# Patient Record
Sex: MALE | Race: WHITE | Employment: PART TIME | URBAN - METROPOLITAN AREA
[De-identification: names, ages, dates, MRNs, and addresses within clinical notes are randomized per-mention and may not be internally consistent; named-entity substitution may affect disease eponyms.]

---

## 2022-11-21 ENCOUNTER — APPOINTMENT (OUTPATIENT)
Dept: GENERAL RADIOLOGY | Age: 72
End: 2022-11-21
Payer: MEDICARE

## 2022-11-21 ENCOUNTER — HOSPITAL ENCOUNTER (EMERGENCY)
Age: 72
Discharge: HOME | End: 2022-11-21
Attending: EMERGENCY MEDICINE
Payer: MEDICARE

## 2022-11-21 VITALS
WEIGHT: 230 LBS | RESPIRATION RATE: 18 BRPM | OXYGEN SATURATION: 98 % | SYSTOLIC BLOOD PRESSURE: 128 MMHG | TEMPERATURE: 98.3 F | HEART RATE: 80 BPM | BODY MASS INDEX: 32.93 KG/M2 | DIASTOLIC BLOOD PRESSURE: 70 MMHG | HEIGHT: 70 IN

## 2022-11-21 DIAGNOSIS — J20.9 ACUTE BRONCHITIS, UNSPECIFIED ORGANISM: ICD-10-CM

## 2022-11-21 DIAGNOSIS — J06.9 VIRAL URI WITH COUGH: Primary | ICD-10-CM

## 2022-11-21 LAB
GLUCOSE BLD-MCNC: 149 MG/DL (ref 70–99)
PERFORMED ON: ABNORMAL
RAPID INFLUENZA  B AGN: NEGATIVE
RAPID INFLUENZA A AGN: NEGATIVE
SARS-COV-2, NAAT: NOT DETECTED

## 2022-11-21 PROCEDURE — 87804 INFLUENZA ASSAY W/OPTIC: CPT

## 2022-11-21 PROCEDURE — 99284 EMERGENCY DEPT VISIT MOD MDM: CPT

## 2022-11-21 PROCEDURE — 6370000000 HC RX 637 (ALT 250 FOR IP): Performed by: EMERGENCY MEDICINE

## 2022-11-21 PROCEDURE — 71046 X-RAY EXAM CHEST 2 VIEWS: CPT

## 2022-11-21 PROCEDURE — 87635 SARS-COV-2 COVID-19 AMP PRB: CPT

## 2022-11-21 RX ORDER — GLIPIZIDE 10 MG/1
10 TABLET ORAL
COMMUNITY

## 2022-11-21 RX ORDER — BENZONATATE 100 MG/1
100 CAPSULE ORAL 2 TIMES DAILY PRN
Qty: 20 CAPSULE | Refills: 0 | Status: SHIPPED | OUTPATIENT
Start: 2022-11-21 | End: 2022-11-28

## 2022-11-21 RX ORDER — ALBUTEROL SULFATE 90 UG/1
2 AEROSOL, METERED RESPIRATORY (INHALATION) 4 TIMES DAILY PRN
Qty: 18 G | Refills: 0 | Status: SHIPPED | OUTPATIENT
Start: 2022-11-21

## 2022-11-21 RX ORDER — FAMOTIDINE, CALCIUM CARBONATE, AND MAGNESIUM HYDROXIDE 10; 800; 165 MG/1; MG/1; MG/1
1 TABLET, CHEWABLE ORAL DAILY
COMMUNITY

## 2022-11-21 RX ORDER — ALBUTEROL SULFATE 90 UG/1
2 AEROSOL, METERED RESPIRATORY (INHALATION) ONCE
Status: COMPLETED | OUTPATIENT
Start: 2022-11-21 | End: 2022-11-21

## 2022-11-21 RX ORDER — BENZONATATE 100 MG/1
200 CAPSULE ORAL ONCE
Status: COMPLETED | OUTPATIENT
Start: 2022-11-21 | End: 2022-11-21

## 2022-11-21 RX ADMIN — BENZONATATE 200 MG: 100 CAPSULE ORAL at 10:49

## 2022-11-21 RX ADMIN — ALBUTEROL SULFATE 2 PUFF: 90 AEROSOL, METERED RESPIRATORY (INHALATION) at 10:49

## 2022-11-21 ASSESSMENT — ENCOUNTER SYMPTOMS
PHOTOPHOBIA: 0
RHINORRHEA: 0
NAUSEA: 0
SHORTNESS OF BREATH: 0
COUGH: 1
VOMITING: 0
BACK PAIN: 0
ABDOMINAL PAIN: 0
DIARRHEA: 0
WHEEZING: 1

## 2022-11-21 ASSESSMENT — PAIN - FUNCTIONAL ASSESSMENT
PAIN_FUNCTIONAL_ASSESSMENT: NONE - DENIES PAIN

## 2022-11-21 NOTE — ED NOTES
Pt denies N/V/D, SOB, chest pressure or CP or fevers.  Resps even and unlab     Shaun Valles RN  11/21/22 2904

## 2022-11-21 NOTE — ED PROVIDER NOTES
Emergency Department Provider Note  Location: [address] EMERGENCY DEPARTMENT  11/21/2022     Patient Identification  Sanaz Romero is a 74 y.o. male    Chief Complaint  URI (C/o harsh NP cough, sinus and chest congestion with HA )          HPI  (History provided by patient)  Patient is a 26-year-old male history of CKD non-insulin-dependent diabetes hypertension hyperlipidemia who presents with dry nonproductive cough persistent and progressive since Wednesday. Dry hacking cough will not go away. No exacerbating alleviating factors. Denies any fevers chills shortness of breath or lightheadedness. No chest pain. No pleurisy. Also reports congestion rhinorrhea postnasal drip. Patient wanted to be evaluated make sure he is not developing a pneumonia and for additional cough suppressants. He is otherwise taking over-the-counter meds with little improvement. I have reviewed the following nursing documentation:  Allergies: Allergies   Allergen Reactions    Amoxicillin     Januvia [Sitagliptin Phosphate] Rash    Onglyza [Saxagliptin] Itching and Rash       Past medical history:  has a past medical history of CKD (chronic kidney disease), Diabetes mellitus ([address] Hyperlipidemia, and Hypertension. Past surgical history:  has a past surgical history that includes Leg Surgery; Ankle surgery; Colonoscopy (2011); and Colonoscopy (09/14/2016). Home medications:   Prior to Admission medications    Medication Sig Start Date End Date Taking?  Authorizing Provider   glipiZIDE (GLUCOTROL) 10 MG tablet Take 10 mg by mouth 2 times daily (before meals)   Yes Historical Provider, MD   Famotidine-Ca Carb-Mag Hydrox (PEPCID COMPLETE) -165 MG CHEW Take 1 tablet by mouth daily   Yes Historical Provider, MD   benzonatate (TESSALON) 100 MG capsule Take 1 capsule by mouth 2 times daily as needed for Cough 11/21/22 11/28/22 Yes Pita Trujillo MD   albuterol sulfate HFA (VENTOLIN HFA) 108 (90 Base) MCG/ACT inhaler Inhale 2 puffs into the lungs 4 times daily as needed for Wheezing 11/21/22  Yes Davidson Nichole MD   atorvastatin (LIPITOR) 20 MG tablet Take 20 mg by mouth daily. Historical Provider, MD   metformin (FORTAMET) 1000 MG (OSM) ER tablet Take 1,000 mg by mouth 2 times daily. 3/22/11   Historical Provider, MD   lisinopril (PRINIVIL;ZESTRIL) 20 MG tablet Take 20 mg by mouth daily. Historical Provider, MD   pioglitazone (ACTOS) 45 MG tablet Take 45 mg by mouth daily. Historical Provider, MD   aspirin 81 MG EC tablet Take 81 mg by mouth daily. Historical Provider, MD   therapeutic multivitamin-minerals (THERAGRAN-M) tablet Take 1 tablet by mouth daily. Historical Provider, MD   vitamin E 400 UNIT capsule Take 400 Units by mouth daily. Historical Provider, MD       Social history:  reports that he has never smoked. He has never used smokeless tobacco. He reports that he does not drink alcohol and does not use drugs. Family history:    Family History   Problem Relation Age of Onset    Heart Disease Mother     High Blood Pressure Mother     Diabetes Father     High Blood Pressure Father     Other Father         alzeimers    Diabetes Brother     Heart Disease Brother         MI    Cancer Brother         skin    High Blood Pressure Brother     High Blood Pressure Brother     Diabetes Brother          ROS  Review of Systems   Constitutional:  Negative for chills and fever. HENT:  Negative for congestion and rhinorrhea. Eyes:  Negative for photophobia and visual disturbance. Respiratory:  Positive for cough and wheezing. Negative for shortness of breath. Cardiovascular:  Negative for chest pain and palpitations. Gastrointestinal:  Negative for abdominal pain, diarrhea, nausea and vomiting. Genitourinary:  Negative for dysuria and hematuria. Musculoskeletal:  Negative for back pain and neck pain. Skin:  Negative for rash and wound. Neurological:  Negative for syncope and weakness. Radiology  XR CHEST (2 VW)    Result Date: 11/21/2022  EXAMINATION: TWO XRAY VIEWS OF THE CHEST 11/21/2022 9:41 am COMPARISON: None available. HISTORY: ORDERING SYSTEM PROVIDED HISTORY: cough TECHNOLOGIST PROVIDED HISTORY: Reason for exam:->cough Reason for Exam: cough X 5 days FINDINGS: The lungs are clear. The cardiac silhouette is within normal limits. There is no pneumothorax or pleural effusion. 1.  No acute abnormality. Bedside Ultrasound  No results found. Labs  Results for orders placed or performed during the hospital encounter of 11/21/22   COVID-19, Rapid    Specimen: Nasopharyngeal Swab   Result Value Ref Range    SARS-CoV-2, NAAT Not Detected Not Detected   Rapid influenza A/B antigens    Specimen: Nasopharyngeal   Result Value Ref Range    Rapid Influenza A Ag Negative Negative    Rapid Influenza B Ag Negative Negative         Procedures  Procedures      MDM  Patient seen and evaluated. Relevant records reviewed. - Patient is 74 y.o. male presented for persistent dry hacking cough consistent with viral URI with cough. - Exam showed as noted above  - Workup here as noted above. COVID and flu negative, chest x-ray without infiltrate. Clear breath sounds. I have low concern for pneumonia sepsis at this point do not indication for blood work. We discussed further supportive measures. Given his mild wheezing on exam also prescribing him a Ventolin inhaler. Discussed follow-up and return precautions. - I have a low concern for  other emergent process, and do not see indication for further work-up in the ER, as it is unlikely  and poses more risk than benefit. - I discussed the results, including any incidental findings, with patient. Questions answered. We agreed to discharge. Patient/family agreeable to plan and express understanding of plan. Clinical Impression:  1.  Viral URI with cough    2. Acute bronchitis, unspecified organism Disposition:  Discharge to home in good condition. Blood pressure 111/72, pulse 79, temperature 97.9 °F (36.6 °C), temperature source Oral, resp. rate 20, height 5' 10\" (1.778 m), weight 230 lb (104.3 kg), SpO2 96 %. Patient was given scripts for the following medications. I counseled patient how to take these medications. New Prescriptions    ALBUTEROL SULFATE HFA (VENTOLIN HFA) 108 (90 BASE) MCG/ACT INHALER    Inhale 2 puffs into the lungs 4 times daily as needed for Wheezing    BENZONATATE (TESSALON) 100 MG CAPSULE    Take 1 capsule by mouth 2 times daily as needed for Cough       Disposition referral (if applicable): [place] [address] [place] [facility] [address] [facility]  [phone]    Schedule an appointment as soon as possible for a visit       I, Louie March, am the primary attending of record and contributed the majority of evaluation and treatment of emergent care for this encounter. Total critical care time is 0 minutes, which excludes separately billable procedures and updating family. Time spent is specifically for management of the presenting complaint and symptoms initially, direct bedside care, reevaluation, review of records, and consultation. There was a high probability of clinically significant life-threatening deterioration in the patient's condition, which required my urgent intervention. This chart was generated in part by using Dragon Dictation system and may contain errors related to that system including errors in grammar, punctuation, and spelling, as well as words and phrases that may be inappropriate. If there are any questions or concerns please feel free to contact the dictating provider for clarification.      MD Jaems Blum MD  11/21/22 3823

## 2024-11-08 ENCOUNTER — HOSPITAL ENCOUNTER (OUTPATIENT)
Dept: NUCLEAR MEDICINE | Age: 74
Discharge: HOME | End: 2024-11-08
Attending: INTERNAL MEDICINE
Payer: MEDICARE

## 2024-11-08 VITALS — HEIGHT: 70 IN | BODY MASS INDEX: 32.93 KG/M2 | WEIGHT: 230 LBS

## 2024-11-08 DIAGNOSIS — R10.9 ACUTE ABDOMINAL PAIN: ICD-10-CM

## 2024-11-08 PROCEDURE — A9537 TC99M MEBROFENIN: HCPCS | Performed by: INTERNAL MEDICINE

## 2024-11-08 PROCEDURE — 78227 HEPATOBIL SYST IMAGE W/DRUG: CPT

## 2024-11-08 PROCEDURE — 3430000000 HC RX DIAGNOSTIC RADIOPHARMACEUTICAL: Performed by: INTERNAL MEDICINE

## 2024-11-08 PROCEDURE — 6360000002 HC RX W HCPCS: Performed by: INTERNAL MEDICINE

## 2024-11-08 PROCEDURE — 2580000003 HC RX 258: Performed by: INTERNAL MEDICINE

## 2024-11-08 RX ADMIN — SODIUM CHLORIDE 2.09 MCG: 9 INJECTION, SOLUTION INTRAVENOUS at 08:55

## 2024-11-08 RX ADMIN — Medication 5.8 MILLICURIE: at 07:56

## 2025-01-01 ENCOUNTER — APPOINTMENT (OUTPATIENT)
Dept: GENERAL RADIOLOGY | Age: 75
End: 2025-01-01
Payer: MEDICARE

## 2025-01-01 ENCOUNTER — APPOINTMENT (OUTPATIENT)
Age: 75
End: 2025-01-01
Attending: INTERNAL MEDICINE
Payer: MEDICARE

## 2025-01-01 ENCOUNTER — APPOINTMENT (OUTPATIENT)
Dept: CT IMAGING | Age: 75
End: 2025-01-01
Payer: MEDICARE

## 2025-01-01 ENCOUNTER — APPOINTMENT (OUTPATIENT)
Dept: MRI IMAGING | Age: 75
End: 2025-01-01
Payer: MEDICARE

## 2025-01-01 ENCOUNTER — APPOINTMENT (OUTPATIENT)
Dept: CT IMAGING | Age: 75
End: 2025-01-01
Attending: EMERGENCY MEDICINE
Payer: MEDICARE

## 2025-01-01 ENCOUNTER — HOSPITAL ENCOUNTER (EMERGENCY)
Age: 75
End: 2025-05-01
Attending: EMERGENCY MEDICINE
Payer: MEDICARE

## 2025-01-01 VITALS
HEIGHT: 70 IN | HEART RATE: 146 BPM | OXYGEN SATURATION: 98 % | TEMPERATURE: 98 F | WEIGHT: 207.01 LBS | SYSTOLIC BLOOD PRESSURE: 118 MMHG | DIASTOLIC BLOOD PRESSURE: 77 MMHG | RESPIRATION RATE: 18 BRPM | BODY MASS INDEX: 29.64 KG/M2

## 2025-01-01 DIAGNOSIS — R00.0 WIDE-COMPLEX TACHYCARDIA: Primary | ICD-10-CM

## 2025-01-01 DIAGNOSIS — I21.4 NSTEMI (NON-ST ELEVATED MYOCARDIAL INFARCTION) (HCC): ICD-10-CM

## 2025-01-01 DIAGNOSIS — I47.20 VENTRICULAR TACHYCARDIA (HCC): ICD-10-CM

## 2025-01-01 DIAGNOSIS — I63.9 THROMBOEMBOLIC STROKE (HCC): ICD-10-CM

## 2025-01-01 LAB
ALBUMIN SERPL-MCNC: 4.4 G/DL (ref 3.4–5)
ALBUMIN/GLOB SERPL: 1.4 {RATIO} (ref 1.1–2.2)
ALP SERPL-CCNC: 338 U/L (ref 40–129)
ALT SERPL-CCNC: 52 U/L (ref 10–40)
ANION GAP SERPL CALCULATED.3IONS-SCNC: 19 MMOL/L (ref 3–16)
AST SERPL-CCNC: 278 U/L (ref 15–37)
BASOPHILS # BLD: 0.1 K/UL (ref 0–0.2)
BASOPHILS NFR BLD: 0.8 %
BILIRUB SERPL-MCNC: 0.7 MG/DL (ref 0–1)
BUN SERPL-MCNC: 21 MG/DL (ref 7–20)
CALCIUM SERPL-MCNC: 10.5 MG/DL (ref 8.3–10.6)
CHLORIDE SERPL-SCNC: 100 MMOL/L (ref 99–110)
CO2 SERPL-SCNC: 19 MMOL/L (ref 21–32)
CREAT SERPL-MCNC: 1.3 MG/DL (ref 0.8–1.3)
DEPRECATED RDW RBC AUTO: 15.5 % (ref 12.4–15.4)
ECHO AO ARCH DIAM: 2.3 CM
ECHO AO ASC DIAM: 3.1 CM
ECHO AO ASCENDING AORTA INDEX: 1.46 CM/M2
ECHO AO DESC DIAM: 2.3 CM
ECHO AO DESCENDING AORTA INDEX: 1.08 CM/M2
ECHO AO ROOT DIAM: 3.3 CM
ECHO AO ROOT INDEX: 1.56 CM/M2
ECHO AV AREA PEAK VELOCITY: 4 CM2
ECHO AV AREA/BSA PEAK VELOCITY: 1.9 CM2/M2
ECHO AV CUSP MM: 1.8 CM
ECHO AV PEAK GRADIENT: 4 MMHG
ECHO AV PEAK VELOCITY: 1.1 M/S
ECHO AV VELOCITY RATIO: 0.73
ECHO BSA: 2.15 M2
ECHO EST RA PRESSURE: 8 MMHG
ECHO IVC PROX: 2.1 CM
ECHO LA AREA 2C: 21.2 CM2
ECHO LA AREA 4C: 22.8 CM2
ECHO LA DIAMETER INDEX: 2.17 CM/M2
ECHO LA DIAMETER: 4.6 CM
ECHO LA MAJOR AXIS: 5.5 CM
ECHO LA MINOR AXIS: 5.9 CM
ECHO LA TO AORTIC ROOT RATIO: 1.39
ECHO LA VOL BP: 68 ML (ref 18–58)
ECHO LA VOL MOD A2C: 63 ML (ref 18–58)
ECHO LA VOL MOD A4C: 71 ML (ref 18–58)
ECHO LA VOL/BSA BIPLANE: 32 ML/M2 (ref 16–34)
ECHO LA VOLUME INDEX MOD A2C: 30 ML/M2 (ref 16–34)
ECHO LA VOLUME INDEX MOD A4C: 33 ML/M2 (ref 16–34)
ECHO LV E' LATERAL VELOCITY: 6.64 CM/S
ECHO LV E' SEPTAL VELOCITY: 5.22 CM/S
ECHO LV EDV 3D: 155 ML
ECHO LV EDV A2C: 140 ML
ECHO LV EDV A4C: 122 ML
ECHO LV EDV INDEX 3D: 73 ML/M2
ECHO LV EDV INDEX A4C: 58 ML/M2
ECHO LV EDV NDEX A2C: 66 ML/M2
ECHO LV EF PHYSICIAN: 35 %
ECHO LV EJECTION FRACTION 3D: 36 %
ECHO LV EJECTION FRACTION A2C: 4 %
ECHO LV EJECTION FRACTION A4C: 34 %
ECHO LV EJECTION FRACTION BIPLANE: 15 % (ref 55–100)
ECHO LV ESV 3D: 100 ML
ECHO LV ESV A2C: 135 ML
ECHO LV ESV A4C: 81 ML
ECHO LV ESV INDEX 3D: 47 ML/M2
ECHO LV ESV INDEX A2C: 64 ML/M2
ECHO LV ESV INDEX A4C: 38 ML/M2
ECHO LV FRACTIONAL SHORTENING: 19 % (ref 28–44)
ECHO LV INTERNAL DIMENSION DIASTOLE INDEX: 2.69 CM/M2
ECHO LV INTERNAL DIMENSION DIASTOLIC: 5.7 CM (ref 4.2–5.9)
ECHO LV INTERNAL DIMENSION SYSTOLIC INDEX: 2.17 CM/M2
ECHO LV INTERNAL DIMENSION SYSTOLIC: 4.6 CM
ECHO LV ISOVOLUMETRIC RELAXATION TIME (IVRT): 77 MS
ECHO LV IVSD: 0.8 CM (ref 0.6–1)
ECHO LV MASS 2D: 157.1 G (ref 88–224)
ECHO LV MASS 3D INDEX: 89.6 G/M2
ECHO LV MASS 3D: 190 G
ECHO LV MASS INDEX 2D: 74.1 G/M2 (ref 49–115)
ECHO LV POSTERIOR WALL DIASTOLIC: 0.7 CM (ref 0.6–1)
ECHO LV RELATIVE WALL THICKNESS RATIO: 0.25
ECHO LVOT AREA: 5.3 CM2
ECHO LVOT DIAM: 2.6 CM
ECHO LVOT MEAN GRADIENT: 1 MMHG
ECHO LVOT PEAK GRADIENT: 2 MMHG
ECHO LVOT PEAK VELOCITY: 0.8 M/S
ECHO LVOT STROKE VOLUME INDEX: 33.3 ML/M2
ECHO LVOT SV: 70.6 ML
ECHO LVOT VTI: 13.3 CM
ECHO MV A VELOCITY: 0.68 M/S
ECHO MV E DECELERATION TIME (DT): 155 MS
ECHO MV E VELOCITY: 0.42 M/S
ECHO MV E/A RATIO: 0.62
ECHO MV E/E' LATERAL: 6.33
ECHO MV E/E' RATIO (AVERAGED): 7.19
ECHO MV E/E' SEPTAL: 8.05
ECHO RA AREA 4C: 11.3 CM2
ECHO RA END SYSTOLIC VOLUME APICAL 4 CHAMBER INDEX BSA: 11 ML/M2
ECHO RA VOLUME: 24 ML
ECHO RV BASAL DIMENSION: 3.4 CM
ECHO RV FREE WALL PEAK S': 10.3 CM/S
ECHO RV LONGITUDINAL DIMENSION: 6.8 CM
ECHO RV MID DIMENSION: 3.2 CM
ECHO RV TAPSE: 1.7 CM (ref 1.7–?)
EKG ATRIAL RATE: 146 BPM
EKG DIAGNOSIS: NORMAL
EKG P AXIS: 114 DEGREES
EKG P-R INTERVAL: 148 MS
EKG Q-T INTERVAL: 250 MS
EKG QRS DURATION: 130 MS
EKG QTC CALCULATION (BAZETT): 389 MS
EKG R AXIS: 84 DEGREES
EKG T AXIS: -77 DEGREES
EKG VENTRICULAR RATE: 146 BPM
EOSINOPHIL # BLD: 0 K/UL (ref 0–0.6)
EOSINOPHIL NFR BLD: 0.2 %
GFR SERPLBLD CREATININE-BSD FMLA CKD-EPI: 57 ML/MIN/{1.73_M2}
GLUCOSE BLD-MCNC: 199 MG/DL
GLUCOSE BLD-MCNC: 199 MG/DL (ref 70–99)
GLUCOSE BLD-MCNC: 211 MG/DL (ref 70–99)
GLUCOSE SERPL-MCNC: 212 MG/DL (ref 70–99)
HCT VFR BLD AUTO: 40.4 % (ref 40.5–52.5)
HGB BLD-MCNC: 13.4 G/DL (ref 13.5–17.5)
INR PPP: 1.37 (ref 0.85–1.15)
LYMPHOCYTES # BLD: 1.8 K/UL (ref 1–5.1)
LYMPHOCYTES NFR BLD: 14 %
MCH RBC QN AUTO: 27.1 PG (ref 26–34)
MCHC RBC AUTO-ENTMCNC: 33.1 G/DL (ref 31–36)
MCV RBC AUTO: 81.8 FL (ref 80–100)
MONOCYTES # BLD: 1.5 K/UL (ref 0–1.3)
MONOCYTES NFR BLD: 11.8 %
NEUTROPHILS # BLD: 9.3 K/UL (ref 1.7–7.7)
NEUTROPHILS NFR BLD: 73.2 %
PERFORMED ON: ABNORMAL
PERFORMED ON: ABNORMAL
PLATELET # BLD AUTO: 215 K/UL (ref 135–450)
PMV BLD AUTO: 8.9 FL (ref 5–10.5)
POTASSIUM SERPL-SCNC: 3.9 MMOL/L (ref 3.5–5.1)
PROT SERPL-MCNC: 7.6 G/DL (ref 6.4–8.2)
PROTHROMBIN TIME: 17 SEC (ref 11.9–14.9)
RBC # BLD AUTO: 4.94 M/UL (ref 4.2–5.9)
REASON FOR REJECTION: NORMAL
REJECTED TEST: NORMAL
SODIUM SERPL-SCNC: 138 MMOL/L (ref 136–145)
TROPONIN, HIGH SENSITIVITY: 2520 NG/L (ref 0–22)
TROPONIN, HIGH SENSITIVITY: 2906 NG/L (ref 0–22)
WBC # BLD AUTO: 12.7 K/UL (ref 4–11)

## 2025-01-01 PROCEDURE — 99285 EMERGENCY DEPT VISIT HI MDM: CPT

## 2025-01-01 PROCEDURE — 70551 MRI BRAIN STEM W/O DYE: CPT

## 2025-01-01 PROCEDURE — 6360000004 HC RX CONTRAST MEDICATION: Performed by: INTERNAL MEDICINE

## 2025-01-01 PROCEDURE — 84484 ASSAY OF TROPONIN QUANT: CPT

## 2025-01-01 PROCEDURE — 6360000002 HC RX W HCPCS: Performed by: EMERGENCY MEDICINE

## 2025-01-01 PROCEDURE — 70498 CT ANGIOGRAPHY NECK: CPT

## 2025-01-01 PROCEDURE — 85610 PROTHROMBIN TIME: CPT

## 2025-01-01 PROCEDURE — 96375 TX/PRO/DX INJ NEW DRUG ADDON: CPT

## 2025-01-01 PROCEDURE — 96374 THER/PROPH/DIAG INJ IV PUSH: CPT

## 2025-01-01 PROCEDURE — 93010 ELECTROCARDIOGRAM REPORT: CPT | Performed by: INTERNAL MEDICINE

## 2025-01-01 PROCEDURE — 71045 X-RAY EXAM CHEST 1 VIEW: CPT

## 2025-01-01 PROCEDURE — 2580000003 HC RX 258: Performed by: EMERGENCY MEDICINE

## 2025-01-01 PROCEDURE — 6360000004 HC RX CONTRAST MEDICATION: Performed by: EMERGENCY MEDICINE

## 2025-01-01 PROCEDURE — 80053 COMPREHEN METABOLIC PANEL: CPT

## 2025-01-01 PROCEDURE — 2500000003 HC RX 250 WO HCPCS: Performed by: EMERGENCY MEDICINE

## 2025-01-01 PROCEDURE — 93005 ELECTROCARDIOGRAM TRACING: CPT | Performed by: EMERGENCY MEDICINE

## 2025-01-01 PROCEDURE — 93306 TTE W/DOPPLER COMPLETE: CPT | Performed by: INTERNAL MEDICINE

## 2025-01-01 PROCEDURE — C8929 TTE W OR WO FOL WCON,DOPPLER: HCPCS

## 2025-01-01 PROCEDURE — 36415 COLL VENOUS BLD VENIPUNCTURE: CPT

## 2025-01-01 PROCEDURE — 0042T CT BRAIN PERFUSION: CPT

## 2025-01-01 PROCEDURE — 99291 CRITICAL CARE FIRST HOUR: CPT | Performed by: INTERNAL MEDICINE

## 2025-01-01 PROCEDURE — 96376 TX/PRO/DX INJ SAME DRUG ADON: CPT

## 2025-01-01 PROCEDURE — 31500 INSERT EMERGENCY AIRWAY: CPT

## 2025-01-01 PROCEDURE — 70450 CT HEAD/BRAIN W/O DYE: CPT

## 2025-01-01 PROCEDURE — 85025 COMPLETE CBC W/AUTO DIFF WBC: CPT

## 2025-01-01 RX ORDER — HEPARIN SODIUM 1000 [USP'U]/ML
INJECTION, SOLUTION INTRAVENOUS; SUBCUTANEOUS DAILY PRN
Status: COMPLETED | OUTPATIENT
Start: 2025-01-01 | End: 2025-01-01

## 2025-01-01 RX ORDER — NOREPINEPHRINE BITARTRATE 0.06 MG/ML
1-100 INJECTION, SOLUTION INTRAVENOUS CONTINUOUS
Status: DISCONTINUED | OUTPATIENT
Start: 2025-01-01 | End: 2025-01-01 | Stop reason: HOSPADM

## 2025-01-01 RX ORDER — FENTANYL CITRATE-0.9 % NACL/PF 20 MCG/2ML
50 SYRINGE (ML) INTRAVENOUS EVERY 30 MIN PRN
Refills: 0 | Status: DISCONTINUED | OUTPATIENT
Start: 2025-01-01 | End: 2025-01-01 | Stop reason: HOSPADM

## 2025-01-01 RX ORDER — PROPOFOL 10 MG/ML
5-50 INJECTION, EMULSION INTRAVENOUS CONTINUOUS
Status: DISCONTINUED | OUTPATIENT
Start: 2025-01-01 | End: 2025-01-01 | Stop reason: HOSPADM

## 2025-01-01 RX ORDER — 0.9 % SODIUM CHLORIDE 0.9 %
1000 INTRAVENOUS SOLUTION INTRAVENOUS ONCE
Status: COMPLETED | OUTPATIENT
Start: 2025-01-01 | End: 2025-01-01

## 2025-01-01 RX ORDER — IOPAMIDOL 755 MG/ML
75 INJECTION, SOLUTION INTRAVASCULAR
Status: COMPLETED | OUTPATIENT
Start: 2025-01-01 | End: 2025-01-01

## 2025-01-01 RX ORDER — HEPARIN SODIUM 5000 [USP'U]/ML
INJECTION, SOLUTION INTRAVENOUS; SUBCUTANEOUS
Status: DISCONTINUED
Start: 2025-01-01 | End: 2025-01-01 | Stop reason: HOSPADM

## 2025-01-01 RX ORDER — ASPIRIN 81 MG/1
81 TABLET, CHEWABLE ORAL ONCE
Status: DISCONTINUED | OUTPATIENT
Start: 2025-01-01 | End: 2025-01-01 | Stop reason: HOSPADM

## 2025-01-01 RX ORDER — EPINEPHRINE IN SOD CHLOR,ISO 1 MG/10 ML
SYRINGE (ML) INTRAVENOUS DAILY PRN
Status: COMPLETED | OUTPATIENT
Start: 2025-01-01 | End: 2025-01-01

## 2025-01-01 RX ORDER — FENTANYL CITRATE-0.9 % NACL/PF 10 MCG/ML
25-200 PLASTIC BAG, INJECTION (ML) INTRAVENOUS CONTINUOUS
Status: DISCONTINUED | OUTPATIENT
Start: 2025-01-01 | End: 2025-01-01 | Stop reason: HOSPADM

## 2025-01-01 RX ADMIN — EPINEPHRINE 1 MG: 0.1 INJECTION, SOLUTION ENDOTRACHEAL; INTRACARDIAC; INTRAVENOUS at 13:19

## 2025-01-01 RX ADMIN — AMIODARONE HYDROCHLORIDE 1 MG/MIN: 1.8 INJECTION, SOLUTION INTRAVENOUS at 12:34

## 2025-01-01 RX ADMIN — SODIUM CHLORIDE 1000 ML: 9 INJECTION, SOLUTION INTRAVENOUS at 11:36

## 2025-01-01 RX ADMIN — EPINEPHRINE 1 MG: 0.1 INJECTION, SOLUTION ENDOTRACHEAL; INTRACARDIAC; INTRAVENOUS at 13:30

## 2025-01-01 RX ADMIN — EPINEPHRINE 1 MG: 0.1 INJECTION, SOLUTION ENDOTRACHEAL; INTRACARDIAC; INTRAVENOUS at 13:34

## 2025-01-01 RX ADMIN — HEPARIN SODIUM 4000 UNITS: 1000 INJECTION INTRAVENOUS; SUBCUTANEOUS at 13:21

## 2025-01-01 RX ADMIN — IOPAMIDOL 75 ML: 755 INJECTION, SOLUTION INTRAVENOUS at 11:20

## 2025-01-01 RX ADMIN — SULFUR HEXAFLUORIDE 2 ML: 60.7; .19; .19 INJECTION, POWDER, LYOPHILIZED, FOR SUSPENSION INTRAVENOUS; INTRAVESICAL at 12:59

## 2025-01-01 RX ADMIN — IOPAMIDOL 40 ML: 755 INJECTION, SOLUTION INTRAVENOUS at 11:55

## 2025-01-01 RX ADMIN — EPINEPHRINE 1 MG: 0.1 INJECTION, SOLUTION ENDOTRACHEAL; INTRACARDIAC; INTRAVENOUS at 13:27

## 2025-01-01 RX ADMIN — EPINEPHRINE 1 MG: 0.1 INJECTION, SOLUTION ENDOTRACHEAL; INTRACARDIAC; INTRAVENOUS at 13:16

## 2025-01-01 RX ADMIN — EPINEPHRINE 1 MG: 0.1 INJECTION, SOLUTION ENDOTRACHEAL; INTRACARDIAC; INTRAVENOUS at 13:22

## 2025-01-07 ENCOUNTER — TRANSCRIBE ORDERS (OUTPATIENT)
Dept: ADMINISTRATIVE | Age: 75
End: 2025-01-07

## 2025-01-07 DIAGNOSIS — R14.0 BLOATING: Primary | ICD-10-CM

## 2025-01-14 ENCOUNTER — HOSPITAL ENCOUNTER (OUTPATIENT)
Dept: NUCLEAR MEDICINE | Age: 75
Discharge: HOME | End: 2025-01-14
Attending: INTERNAL MEDICINE
Payer: MEDICARE

## 2025-01-14 DIAGNOSIS — R14.0 BLOATING: ICD-10-CM

## 2025-01-14 PROCEDURE — 78264 GASTRIC EMPTYING IMG STUDY: CPT

## 2025-01-14 PROCEDURE — 3430000000 HC RX DIAGNOSTIC RADIOPHARMACEUTICAL: Performed by: INTERNAL MEDICINE

## 2025-01-14 PROCEDURE — A9541 TC99M SULFUR COLLOID: HCPCS | Performed by: INTERNAL MEDICINE

## 2025-01-14 RX ADMIN — Medication 1 MILLICURIE: at 07:31

## 2025-01-24 ENCOUNTER — HOSPITAL ENCOUNTER (OUTPATIENT)
Dept: GENERAL RADIOLOGY | Age: 75
Discharge: HOME | End: 2025-01-24
Attending: INTERNAL MEDICINE
Payer: MEDICARE

## 2025-01-24 DIAGNOSIS — K63.8219 SMALL INTESTINAL BACTERIAL OVERGROWTH: ICD-10-CM

## 2025-01-24 PROCEDURE — 74250 X-RAY XM SM INT 1CNTRST STD: CPT

## 2025-02-14 ENCOUNTER — APPOINTMENT (OUTPATIENT)
Dept: CT IMAGING | Age: 75
End: 2025-02-14
Payer: MEDICARE

## 2025-02-14 ENCOUNTER — HOSPITAL ENCOUNTER (INPATIENT)
Age: 75
LOS: 1 days | Discharge: HOME | End: 2025-02-15
Attending: STUDENT IN AN ORGANIZED HEALTH CARE EDUCATION/TRAINING PROGRAM | Admitting: INTERNAL MEDICINE
Payer: MEDICARE

## 2025-02-14 DIAGNOSIS — G45.9 TIA (TRANSIENT ISCHEMIC ATTACK): Primary | ICD-10-CM

## 2025-02-14 DIAGNOSIS — I63.9 CEREBROVASCULAR ACCIDENT (CVA), UNSPECIFIED MECHANISM (HCC): ICD-10-CM

## 2025-02-14 LAB
ALBUMIN SERPL-MCNC: 3.8 G/DL (ref 3.4–5)
ALBUMIN/GLOB SERPL: 1.2 {RATIO} (ref 1.1–2.2)
ALP SERPL-CCNC: 59 U/L (ref 40–129)
ALT SERPL-CCNC: 12 U/L (ref 10–40)
ANION GAP SERPL CALCULATED.3IONS-SCNC: 12 MMOL/L (ref 3–16)
AST SERPL-CCNC: 17 U/L (ref 15–37)
BASOPHILS # BLD: 0.1 K/UL (ref 0–0.2)
BASOPHILS NFR BLD: 1 %
BILIRUB SERPL-MCNC: 0.3 MG/DL (ref 0–1)
BUN SERPL-MCNC: 15 MG/DL (ref 7–20)
CALCIUM SERPL-MCNC: 9.3 MG/DL (ref 8.3–10.6)
CHLORIDE SERPL-SCNC: 101 MMOL/L (ref 99–110)
CO2 SERPL-SCNC: 26 MMOL/L (ref 21–32)
CREAT SERPL-MCNC: 1.1 MG/DL (ref 0.8–1.3)
DEPRECATED RDW RBC AUTO: 15.3 % (ref 12.4–15.4)
EOSINOPHIL # BLD: 0.5 K/UL (ref 0–0.6)
EOSINOPHIL NFR BLD: 6 %
FLUAV RNA RESP QL NAA+PROBE: NOT DETECTED
FLUBV RNA RESP QL NAA+PROBE: NOT DETECTED
GFR SERPLBLD CREATININE-BSD FMLA CKD-EPI: 69 ML/MIN/{1.73_M2}
GLUCOSE BLD-MCNC: 127 MG/DL (ref 70–99)
GLUCOSE BLD-MCNC: 140 MG/DL (ref 70–99)
GLUCOSE BLD-MCNC: 160 MG/DL (ref 70–99)
GLUCOSE SERPL-MCNC: 147 MG/DL (ref 70–99)
HCT VFR BLD AUTO: 40.4 % (ref 40.5–52.5)
HGB BLD-MCNC: 13.3 G/DL (ref 13.5–17.5)
LACTATE BLDV-SCNC: 1.5 MMOL/L (ref 0.4–2)
LYMPHOCYTES # BLD: 2.3 K/UL (ref 1–5.1)
LYMPHOCYTES NFR BLD: 30 %
MCH RBC QN AUTO: 26.9 PG (ref 26–34)
MCHC RBC AUTO-ENTMCNC: 32.9 G/DL (ref 31–36)
MCV RBC AUTO: 81.9 FL (ref 80–100)
MONOCYTES # BLD: 0.9 K/UL (ref 0–1.3)
MONOCYTES NFR BLD: 11.9 %
NEUTROPHILS # BLD: 3.9 K/UL (ref 1.7–7.7)
NEUTROPHILS NFR BLD: 51.1 %
PERFORMED ON: ABNORMAL
PLATELET # BLD AUTO: 228 K/UL (ref 135–450)
PMV BLD AUTO: 9.1 FL (ref 5–10.5)
POTASSIUM SERPL-SCNC: 3.7 MMOL/L (ref 3.5–5.1)
PROT SERPL-MCNC: 6.9 G/DL (ref 6.4–8.2)
RBC # BLD AUTO: 4.94 M/UL (ref 4.2–5.9)
SARS-COV-2 RNA RESP QL NAA+PROBE: NOT DETECTED
SODIUM SERPL-SCNC: 139 MMOL/L (ref 136–145)
TROPONIN, HIGH SENSITIVITY: 25 NG/L (ref 0–22)
TROPONIN, HIGH SENSITIVITY: 26 NG/L (ref 0–22)
WBC # BLD AUTO: 7.7 K/UL (ref 4–11)

## 2025-02-14 PROCEDURE — 80053 COMPREHEN METABOLIC PANEL: CPT

## 2025-02-14 PROCEDURE — 6370000000 HC RX 637 (ALT 250 FOR IP): Performed by: INTERNAL MEDICINE

## 2025-02-14 PROCEDURE — 85025 COMPLETE CBC W/AUTO DIFF WBC: CPT

## 2025-02-14 PROCEDURE — 83605 ASSAY OF LACTIC ACID: CPT

## 2025-02-14 PROCEDURE — 1200000000 HC SEMI PRIVATE

## 2025-02-14 PROCEDURE — 93005 ELECTROCARDIOGRAM TRACING: CPT | Performed by: STUDENT IN AN ORGANIZED HEALTH CARE EDUCATION/TRAINING PROGRAM

## 2025-02-14 PROCEDURE — 84484 ASSAY OF TROPONIN QUANT: CPT

## 2025-02-14 PROCEDURE — 70496 CT ANGIOGRAPHY HEAD: CPT

## 2025-02-14 PROCEDURE — 99285 EMERGENCY DEPT VISIT HI MDM: CPT

## 2025-02-14 PROCEDURE — 6360000004 HC RX CONTRAST MEDICATION: Performed by: STUDENT IN AN ORGANIZED HEALTH CARE EDUCATION/TRAINING PROGRAM

## 2025-02-14 PROCEDURE — 70450 CT HEAD/BRAIN W/O DYE: CPT

## 2025-02-14 PROCEDURE — 93005 ELECTROCARDIOGRAM TRACING: CPT | Performed by: INTERNAL MEDICINE

## 2025-02-14 PROCEDURE — 87636 SARSCOV2 & INF A&B AMP PRB: CPT

## 2025-02-14 RX ORDER — GLUCAGON 1 MG/ML
1 KIT INJECTION PRN
Status: DISCONTINUED | OUTPATIENT
Start: 2025-02-14 | End: 2025-02-15 | Stop reason: HOSPADM

## 2025-02-14 RX ORDER — ASPIRIN 81 MG/1
81 TABLET, CHEWABLE ORAL ONCE
Status: COMPLETED | OUTPATIENT
Start: 2025-02-14 | End: 2025-02-14

## 2025-02-14 RX ORDER — ASPIRIN 81 MG/1
81 TABLET ORAL DAILY
Status: DISCONTINUED | OUTPATIENT
Start: 2025-02-15 | End: 2025-02-14 | Stop reason: SDUPTHER

## 2025-02-14 RX ORDER — NICOTINE 21 MG/24HR
1 PATCH, TRANSDERMAL 24 HOURS TRANSDERMAL DAILY PRN
Status: DISCONTINUED | OUTPATIENT
Start: 2025-02-14 | End: 2025-02-15 | Stop reason: HOSPADM

## 2025-02-14 RX ORDER — LISINOPRIL 10 MG/1
10 TABLET ORAL DAILY
Status: DISCONTINUED | OUTPATIENT
Start: 2025-02-15 | End: 2025-02-15 | Stop reason: HOSPADM

## 2025-02-14 RX ORDER — POLYETHYLENE GLYCOL 3350 17 G/17G
17 POWDER, FOR SOLUTION ORAL DAILY PRN
Status: DISCONTINUED | OUTPATIENT
Start: 2025-02-14 | End: 2025-02-15 | Stop reason: HOSPADM

## 2025-02-14 RX ORDER — DEXTROSE MONOHYDRATE 100 MG/ML
INJECTION, SOLUTION INTRAVENOUS CONTINUOUS PRN
Status: DISCONTINUED | OUTPATIENT
Start: 2025-02-14 | End: 2025-02-15 | Stop reason: HOSPADM

## 2025-02-14 RX ORDER — INSULIN LISPRO 100 [IU]/ML
0-4 INJECTION, SOLUTION INTRAVENOUS; SUBCUTANEOUS
Status: DISCONTINUED | OUTPATIENT
Start: 2025-02-14 | End: 2025-02-15 | Stop reason: HOSPADM

## 2025-02-14 RX ORDER — ASPIRIN 300 MG/1
300 SUPPOSITORY RECTAL DAILY
Status: DISCONTINUED | OUTPATIENT
Start: 2025-02-15 | End: 2025-02-15 | Stop reason: HOSPADM

## 2025-02-14 RX ORDER — ATORVASTATIN CALCIUM 10 MG/1
20 TABLET, FILM COATED ORAL DAILY
Status: DISCONTINUED | OUTPATIENT
Start: 2025-02-15 | End: 2025-02-14 | Stop reason: SDUPTHER

## 2025-02-14 RX ORDER — IOPAMIDOL 755 MG/ML
75 INJECTION, SOLUTION INTRAVASCULAR
Status: COMPLETED | OUTPATIENT
Start: 2025-02-14 | End: 2025-02-14

## 2025-02-14 RX ORDER — ONDANSETRON 2 MG/ML
4 INJECTION INTRAMUSCULAR; INTRAVENOUS EVERY 6 HOURS PRN
Status: DISCONTINUED | OUTPATIENT
Start: 2025-02-14 | End: 2025-02-15 | Stop reason: HOSPADM

## 2025-02-14 RX ORDER — ATORVASTATIN CALCIUM 40 MG/1
80 TABLET, FILM COATED ORAL NIGHTLY
Status: DISCONTINUED | OUTPATIENT
Start: 2025-02-14 | End: 2025-02-15 | Stop reason: HOSPADM

## 2025-02-14 RX ORDER — ACETAMINOPHEN 650 MG/1
650 SUPPOSITORY RECTAL EVERY 6 HOURS PRN
Status: DISCONTINUED | OUTPATIENT
Start: 2025-02-14 | End: 2025-02-15 | Stop reason: HOSPADM

## 2025-02-14 RX ORDER — PROMETHAZINE HYDROCHLORIDE 25 MG/1
12.5 TABLET ORAL EVERY 6 HOURS PRN
Status: DISCONTINUED | OUTPATIENT
Start: 2025-02-14 | End: 2025-02-15 | Stop reason: HOSPADM

## 2025-02-14 RX ORDER — LABETALOL HYDROCHLORIDE 5 MG/ML
10 INJECTION, SOLUTION INTRAVENOUS EVERY 10 MIN PRN
Status: DISCONTINUED | OUTPATIENT
Start: 2025-02-14 | End: 2025-02-15 | Stop reason: HOSPADM

## 2025-02-14 RX ORDER — ACETAMINOPHEN 325 MG/1
650 TABLET ORAL EVERY 6 HOURS PRN
Status: DISCONTINUED | OUTPATIENT
Start: 2025-02-14 | End: 2025-02-15 | Stop reason: HOSPADM

## 2025-02-14 RX ORDER — FAMOTIDINE 20 MG/1
10 TABLET, FILM COATED ORAL DAILY
Status: DISCONTINUED | OUTPATIENT
Start: 2025-02-15 | End: 2025-02-15 | Stop reason: HOSPADM

## 2025-02-14 RX ORDER — ASPIRIN 81 MG/1
81 TABLET, CHEWABLE ORAL DAILY
Status: DISCONTINUED | OUTPATIENT
Start: 2025-02-15 | End: 2025-02-15 | Stop reason: HOSPADM

## 2025-02-14 RX ADMIN — ACETAMINOPHEN 650 MG: 325 TABLET ORAL at 23:52

## 2025-02-14 RX ADMIN — ATORVASTATIN CALCIUM 80 MG: 40 TABLET, FILM COATED ORAL at 21:38

## 2025-02-14 RX ADMIN — ASPIRIN 81 MG: 81 TABLET, CHEWABLE ORAL at 21:35

## 2025-02-14 RX ADMIN — IOPAMIDOL 75 ML: 755 INJECTION, SOLUTION INTRAVENOUS at 19:26

## 2025-02-14 ASSESSMENT — PAIN DESCRIPTION - FREQUENCY: FREQUENCY: CONTINUOUS

## 2025-02-14 ASSESSMENT — PAIN SCALES - GENERAL: PAINLEVEL_OUTOF10: 5

## 2025-02-14 ASSESSMENT — PAIN - FUNCTIONAL ASSESSMENT
PAIN_FUNCTIONAL_ASSESSMENT: NONE - DENIES PAIN
PAIN_FUNCTIONAL_ASSESSMENT: ACTIVITIES ARE NOT PREVENTED

## 2025-02-14 ASSESSMENT — PAIN DESCRIPTION - DESCRIPTORS: DESCRIPTORS: ACHING

## 2025-02-14 ASSESSMENT — PAIN DESCRIPTION - ORIENTATION: ORIENTATION: MID

## 2025-02-14 ASSESSMENT — PAIN DESCRIPTION - PAIN TYPE: TYPE: CHRONIC PAIN

## 2025-02-14 ASSESSMENT — PAIN DESCRIPTION - ONSET: ONSET: ON-GOING

## 2025-02-14 ASSESSMENT — LIFESTYLE VARIABLES
HOW MANY STANDARD DRINKS CONTAINING ALCOHOL DO YOU HAVE ON A TYPICAL DAY: PATIENT DOES NOT DRINK
HOW OFTEN DO YOU HAVE A DRINK CONTAINING ALCOHOL: NEVER

## 2025-02-14 ASSESSMENT — PAIN DESCRIPTION - LOCATION: LOCATION: BACK

## 2025-02-14 NOTE — ED PROVIDER NOTES
[facility] TELEMETRY     EMERGENCY DEPARTMENT ENCOUNTER            Pt Name: Trevor Montoya   MRN: [id number]   Birthdate 1950   Date of evaluation: 2/14/2025   Provider: Jimena Mcintyre MD   PCP: No primary care provider on file.   Note Started: 6:02 PM EST 2/14/25          CHIEF COMPLAINT     Chief Complaint   Patient presents with    Aphasia     From home via EMS. Pt was having aphasia starting at 330pm. No symptoms since EMS arrival to home.         HISTORY OF PRESENT ILLNESS:   History from : Patient   Limitations to history : None     Trevor Montoya is a 74 y.o. male who presents complaining of episode of aphasia and word salad.  Patient states that he was running errands this morning, saw his chiropractor and had an adjustment, has been having some neck stiffness since then.  States that he was on the phone with his brother and had an about 20-minute episode where he was having difficulty finding words and when he was able to get words out they were not making sense.  His symptoms have since resolved.  He denies any numbness or tingling.  Denies falls or injuries or focal weakness.  He states that he follows with Dr. Olmedo in GI and is being treated for small intestine bacterial overgrowth and has had some abdominal bloating and epigastric pain today which is not abnormal for him.  He denies any other complaints or concerns.  Last known normal was around 3:30 PM.    Nursing Notes were all reviewed and agreed with, or any disagreements were addressed in the HPI.     REVIEW OF SYSTEMS :    Positives and Pertinent negatives as per HPI.      MEDICAL HISTORY   has a past medical history of CKD (chronic kidney disease), Diabetes mellitus (HCC), Hyperlipidemia, and Hypertension.    Past Surgical History:   Procedure Laterality Date    ANKLE SURGERY      orif    COLONOSCOPY  2011    COLONOSCOPY  09/14/2016    divert,polyps    LEG SURGERY      orif      CURRENTMEDICATIONS       Current Discharge  NOT to be included for SEP-1 Core Measure due to:  Infection is not suspected       CC/HPI Summary, DDx, ED Course, and Reassessment: Patient is a 74-year-old male, presenting with concerns for episode of aphasia and word salad.  Patient states that he was running errands this morning, did go and have an adjustment on his neck by his chiropractor, and was on the phone with his brother this afternoon when he was having difficulty with word finding and was not making sense when he was talking to his brother.  States that this episode lasted about 20 minutes and his family members apparently called EMS and he was brought to the hospital.  Upon arrival, he is asymptomatic.  He denies pain.  Denies any other complaints or concerns.  His workup here is overall reassuring.  CT of the head is negative.  His NIH was 0 upon arrival as a stroke alert was not initiated.  However, with concerns for TIA, patient will be hospitalized for further workup and treatment of his condition.  He is comfortable in agreement with plan of care.       Patient was given the following medications:   Medications   promethazine (PHENERGAN) tablet 12.5 mg (has no administration in time range)     Or   ondansetron (ZOFRAN) injection 4 mg (has no administration in time range)   melatonin tablet 3 mg (has no administration in time range)   nicotine (NICODERM CQ) 21 MG/24HR 1 patch (has no administration in time range)   acetaminophen (TYLENOL) tablet 650 mg (650 mg Oral Given 2/14/25 8192)     Or   acetaminophen (TYLENOL) suppository 650 mg ( Rectal See Alternative 2/14/25 2352)   polyethylene glycol (GLYCOLAX) packet 17 g (has no administration in time range)   aspirin chewable tablet 81 mg (has no administration in time range)     Or   aspirin suppository 300 mg (has no administration in time range)   atorvastatin (LIPITOR) tablet 80 mg (80 mg Oral Given 2/14/25 1940)   labetalol (NORMODYNE;TRANDATE) injection 10 mg (has no administration in time

## 2025-02-15 ENCOUNTER — APPOINTMENT (OUTPATIENT)
Age: 75
End: 2025-02-15
Attending: INTERNAL MEDICINE
Payer: MEDICARE

## 2025-02-15 ENCOUNTER — APPOINTMENT (OUTPATIENT)
Dept: MRI IMAGING | Age: 75
End: 2025-02-15
Payer: MEDICARE

## 2025-02-15 VITALS
RESPIRATION RATE: 16 BRPM | WEIGHT: 207 LBS | OXYGEN SATURATION: 97 % | HEART RATE: 86 BPM | SYSTOLIC BLOOD PRESSURE: 166 MMHG | TEMPERATURE: 97.7 F | HEIGHT: 70 IN | BODY MASS INDEX: 29.63 KG/M2 | DIASTOLIC BLOOD PRESSURE: 72 MMHG

## 2025-02-15 PROBLEM — E78.2 MIXED HYPERLIPIDEMIA: Status: ACTIVE | Noted: 2025-02-15

## 2025-02-15 PROBLEM — E11.9 WELL CONTROLLED TYPE 2 DIABETES MELLITUS (HCC): Status: ACTIVE | Noted: 2025-02-15

## 2025-02-15 PROBLEM — I10 BENIGN ESSENTIAL HTN: Status: ACTIVE | Noted: 2025-02-15

## 2025-02-15 LAB
ALBUMIN SERPL-MCNC: 3.6 G/DL (ref 3.4–5)
ALBUMIN/GLOB SERPL: 1.5 {RATIO} (ref 1.1–2.2)
ALP SERPL-CCNC: 57 U/L (ref 40–129)
ALT SERPL-CCNC: 8 U/L (ref 10–40)
ANION GAP SERPL CALCULATED.3IONS-SCNC: 13 MMOL/L (ref 3–16)
AST SERPL-CCNC: 20 U/L (ref 15–37)
BASOPHILS # BLD: 0.1 K/UL (ref 0–0.2)
BASOPHILS NFR BLD: 1.1 %
BILIRUB SERPL-MCNC: 0.4 MG/DL (ref 0–1)
BUN SERPL-MCNC: 13 MG/DL (ref 7–20)
CALCIUM SERPL-MCNC: 9.2 MG/DL (ref 8.3–10.6)
CHLORIDE SERPL-SCNC: 104 MMOL/L (ref 99–110)
CHOLEST SERPL-MCNC: 112 MG/DL (ref 0–199)
CO2 SERPL-SCNC: 22 MMOL/L (ref 21–32)
CREAT SERPL-MCNC: 1.2 MG/DL (ref 0.8–1.3)
DEPRECATED RDW RBC AUTO: 15.4 % (ref 12.4–15.4)
ECHO AO ASC DIAM: 3.7 CM
ECHO AO ASCENDING AORTA INDEX: 1.75 CM/M2
ECHO AO ROOT DIAM: 3.4 CM
ECHO AO ROOT INDEX: 1.6 CM/M2
ECHO AV MEAN GRADIENT: 6 MMHG
ECHO AV MEAN VELOCITY: 1.1 M/S
ECHO AV PEAK GRADIENT: 8 MMHG
ECHO AV PEAK VELOCITY: 1.5 M/S
ECHO AV VELOCITY RATIO: 0.8
ECHO AV VTI: 33.1 CM
ECHO BSA: 2.15 M2
ECHO EST RA PRESSURE: 3 MMHG
ECHO IVC EXP: 1.8 CM
ECHO IVC INSP: 0.3 CM
ECHO LA AREA 2C: 19.2 CM2
ECHO LA AREA 4C: 18.2 CM2
ECHO LA MAJOR AXIS: 5.1 CM
ECHO LA MINOR AXIS: 5.5 CM
ECHO LA VOL BP: 55 ML (ref 18–58)
ECHO LA VOL MOD A2C: 55 ML (ref 18–58)
ECHO LA VOL MOD A4C: 52 ML (ref 18–58)
ECHO LA VOL/BSA BIPLANE: 26 ML/M2 (ref 16–34)
ECHO LA VOLUME INDEX MOD A2C: 26 ML/M2 (ref 16–34)
ECHO LA VOLUME INDEX MOD A4C: 25 ML/M2 (ref 16–34)
ECHO LV E' LATERAL VELOCITY: 10.8 CM/S
ECHO LV E' SEPTAL VELOCITY: 7.98 CM/S
ECHO LV EF PHYSICIAN: 55 %
ECHO LV FRACTIONAL SHORTENING: 52 % (ref 28–44)
ECHO LV INTERNAL DIMENSION DIASTOLE INDEX: 2.45 CM/M2
ECHO LV INTERNAL DIMENSION DIASTOLIC: 5.2 CM (ref 4.2–5.9)
ECHO LV INTERNAL DIMENSION SYSTOLIC INDEX: 1.18 CM/M2
ECHO LV INTERNAL DIMENSION SYSTOLIC: 2.5 CM
ECHO LV ISOVOLUMETRIC RELAXATION TIME (IVRT): 111 MS
ECHO LV IVSD: 1 CM (ref 0.6–1)
ECHO LV MASS 2D: 181.4 G (ref 88–224)
ECHO LV MASS INDEX 2D: 85.6 G/M2 (ref 49–115)
ECHO LV POSTERIOR WALL DIASTOLIC: 0.9 CM (ref 0.6–1)
ECHO LV RELATIVE WALL THICKNESS RATIO: 0.35
ECHO LVOT AV VTI INDEX: 0.75
ECHO LVOT MEAN GRADIENT: 3 MMHG
ECHO LVOT PEAK GRADIENT: 6 MMHG
ECHO LVOT PEAK VELOCITY: 1.2 M/S
ECHO LVOT VTI: 24.7 CM
ECHO MV A VELOCITY: 1.21 M/S
ECHO MV E DECELERATION TIME (DT): 261 MS
ECHO MV E VELOCITY: 0.61 M/S
ECHO MV E/A RATIO: 0.5
ECHO MV E/E' LATERAL: 5.65
ECHO MV E/E' RATIO (AVERAGED): 6.65
ECHO MV E/E' SEPTAL: 7.64
ECHO MV LVOT VTI INDEX: 0.81
ECHO MV MAX VELOCITY: 1.1 M/S
ECHO MV MEAN GRADIENT: 2 MMHG
ECHO MV MEAN VELOCITY: 0.7 M/S
ECHO MV PEAK GRADIENT: 4 MMHG
ECHO MV VTI: 20 CM
ECHO PV MAX VELOCITY: 1.1 M/S
ECHO PV MEAN GRADIENT: 3 MMHG
ECHO PV MEAN VELOCITY: 0.8 M/S
ECHO PV PEAK GRADIENT: 5 MMHG
ECHO PV VTI: 22.9 CM
ECHO RA AREA 4C: 13.1 CM2
ECHO RA END SYSTOLIC VOLUME APICAL 4 CHAMBER INDEX BSA: 14 ML/M2
ECHO RA VOLUME: 30 ML
ECHO RV BASAL DIMENSION: 3.7 CM
ECHO RV FREE WALL PEAK S': 11.7 CM/S
ECHO RV LONGITUDINAL DIMENSION: 7.1 CM
ECHO RV MID DIMENSION: 2.7 CM
ECHO RV TAPSE: 2.3 CM (ref 1.7–?)
EKG ATRIAL RATE: 81 BPM
EKG ATRIAL RATE: 84 BPM
EKG DIAGNOSIS: NORMAL
EKG DIAGNOSIS: NORMAL
EKG P AXIS: 28 DEGREES
EKG P AXIS: 34 DEGREES
EKG P-R INTERVAL: 178 MS
EKG P-R INTERVAL: 180 MS
EKG Q-T INTERVAL: 390 MS
EKG Q-T INTERVAL: 406 MS
EKG QRS DURATION: 124 MS
EKG QRS DURATION: 130 MS
EKG QTC CALCULATION (BAZETT): 460 MS
EKG QTC CALCULATION (BAZETT): 471 MS
EKG R AXIS: 149 DEGREES
EKG R AXIS: 258 DEGREES
EKG T AXIS: 41 DEGREES
EKG T AXIS: 5 DEGREES
EKG VENTRICULAR RATE: 81 BPM
EKG VENTRICULAR RATE: 84 BPM
EOSINOPHIL # BLD: 0.5 K/UL (ref 0–0.6)
EOSINOPHIL NFR BLD: 5.5 %
GFR SERPLBLD CREATININE-BSD FMLA CKD-EPI: 63 ML/MIN/{1.73_M2}
GLUCOSE BLD-MCNC: 130 MG/DL (ref 70–99)
GLUCOSE BLD-MCNC: 147 MG/DL (ref 70–99)
GLUCOSE SERPL-MCNC: 103 MG/DL (ref 70–99)
HCT VFR BLD AUTO: 40.6 % (ref 40.5–52.5)
HDLC SERPL-MCNC: 33 MG/DL (ref 40–60)
HGB BLD-MCNC: 13.4 G/DL (ref 13.5–17.5)
LDLC SERPL CALC-MCNC: 58 MG/DL
LYMPHOCYTES # BLD: 2.7 K/UL (ref 1–5.1)
LYMPHOCYTES NFR BLD: 31.3 %
MCH RBC QN AUTO: 26.7 PG (ref 26–34)
MCHC RBC AUTO-ENTMCNC: 32.9 G/DL (ref 31–36)
MCV RBC AUTO: 81.3 FL (ref 80–100)
MONOCYTES # BLD: 1.2 K/UL (ref 0–1.3)
MONOCYTES NFR BLD: 14 %
NEUTROPHILS # BLD: 4.1 K/UL (ref 1.7–7.7)
NEUTROPHILS NFR BLD: 48.1 %
PERFORMED ON: ABNORMAL
PERFORMED ON: ABNORMAL
PLATELET # BLD AUTO: 214 K/UL (ref 135–450)
PMV BLD AUTO: 8.9 FL (ref 5–10.5)
POTASSIUM SERPL-SCNC: 3.7 MMOL/L (ref 3.5–5.1)
PROT SERPL-MCNC: 6 G/DL (ref 6.4–8.2)
RBC # BLD AUTO: 5 M/UL (ref 4.2–5.9)
SODIUM SERPL-SCNC: 139 MMOL/L (ref 136–145)
TRIGL SERPL-MCNC: 105 MG/DL (ref 0–150)
TROPONIN, HIGH SENSITIVITY: 34 NG/L (ref 0–22)
VLDLC SERPL CALC-MCNC: 21 MG/DL
WBC # BLD AUTO: 8.6 K/UL (ref 4–11)

## 2025-02-15 PROCEDURE — 80053 COMPREHEN METABOLIC PANEL: CPT

## 2025-02-15 PROCEDURE — 85025 COMPLETE CBC W/AUTO DIFF WBC: CPT

## 2025-02-15 PROCEDURE — 97530 THERAPEUTIC ACTIVITIES: CPT

## 2025-02-15 PROCEDURE — 80061 LIPID PANEL: CPT

## 2025-02-15 PROCEDURE — 93306 TTE W/DOPPLER COMPLETE: CPT | Performed by: INTERNAL MEDICINE

## 2025-02-15 PROCEDURE — 97166 OT EVAL MOD COMPLEX 45 MIN: CPT

## 2025-02-15 PROCEDURE — 97162 PT EVAL MOD COMPLEX 30 MIN: CPT

## 2025-02-15 PROCEDURE — 83036 HEMOGLOBIN GLYCOSYLATED A1C: CPT

## 2025-02-15 PROCEDURE — 6370000000 HC RX 637 (ALT 250 FOR IP): Performed by: INTERNAL MEDICINE

## 2025-02-15 PROCEDURE — 97535 SELF CARE MNGMENT TRAINING: CPT

## 2025-02-15 PROCEDURE — 84484 ASSAY OF TROPONIN QUANT: CPT

## 2025-02-15 PROCEDURE — 70551 MRI BRAIN STEM W/O DYE: CPT

## 2025-02-15 PROCEDURE — 93010 ELECTROCARDIOGRAM REPORT: CPT | Performed by: INTERNAL MEDICINE

## 2025-02-15 PROCEDURE — 93306 TTE W/DOPPLER COMPLETE: CPT

## 2025-02-15 PROCEDURE — 36415 COLL VENOUS BLD VENIPUNCTURE: CPT

## 2025-02-15 PROCEDURE — 99238 HOSP IP/OBS DSCHRG MGMT 30/<: CPT | Performed by: INTERNAL MEDICINE

## 2025-02-15 RX ADMIN — LISINOPRIL 10 MG: 10 TABLET ORAL at 09:20

## 2025-02-15 RX ADMIN — ASPIRIN 81 MG: 81 TABLET, CHEWABLE ORAL at 09:20

## 2025-02-15 RX ADMIN — FAMOTIDINE 10 MG: 20 TABLET, FILM COATED ORAL at 09:20

## 2025-02-15 RX ADMIN — ACETAMINOPHEN 650 MG: 325 TABLET ORAL at 10:11

## 2025-02-15 ASSESSMENT — PAIN SCALES - GENERAL
PAINLEVEL_OUTOF10: 0
PAINLEVEL_OUTOF10: 3
PAINLEVEL_OUTOF10: 5

## 2025-02-15 ASSESSMENT — PAIN DESCRIPTION - ORIENTATION: ORIENTATION: LOWER;MID

## 2025-02-15 ASSESSMENT — PAIN DESCRIPTION - DESCRIPTORS: DESCRIPTORS: ACHING

## 2025-02-15 ASSESSMENT — PAIN SCALES - WONG BAKER
WONGBAKER_NUMERICALRESPONSE: NO HURT
WONGBAKER_NUMERICALRESPONSE: NO HURT

## 2025-02-15 ASSESSMENT — PAIN - FUNCTIONAL ASSESSMENT: PAIN_FUNCTIONAL_ASSESSMENT: PREVENTS OR INTERFERES SOME ACTIVE ACTIVITIES AND ADLS

## 2025-02-15 ASSESSMENT — PAIN DESCRIPTION - LOCATION: LOCATION: BACK

## 2025-02-15 NOTE — PLAN OF CARE
Problem: Chronic Conditions and Co-morbidities  Goal: Patient's chronic conditions and co-morbidity symptoms are monitored and maintained or improved  2/15/2025 1428 by Claribel Mckay RN  Outcome: Completed  2/15/2025 1117 by Gabi Garcia RN  Outcome: Progressing  2/15/2025 0034 by Elaina Everett RN  Outcome: Progressing     Problem: Discharge Planning  Goal: Discharge to home or other facility with appropriate resources  2/15/2025 1428 by Claribel Mckay RN  Outcome: Completed  2/15/2025 1117 by Gabi Garcia RN  Outcome: Progressing  2/15/2025 0034 by Elaina Everett RN  Outcome: Progressing     Problem: Safety - Adult  Goal: Free from fall injury  2/15/2025 1428 by Claribel Mckay RN  Outcome: Completed  2/15/2025 1117 by Gabi Garcia RN  Outcome: Progressing  2/15/2025 0034 by Elaina Everett RN  Outcome: Progressing     Problem: Pain  Goal: Verbalizes/displays adequate comfort level or baseline comfort level  2/15/2025 1428 by Claribel Mckay RN  Outcome: Completed  2/15/2025 1117 by Gabi Garcia RN  Outcome: Progressing

## 2025-02-15 NOTE — PROGRESS NOTES
Admitted from ER to -1 in stable condition.  VSS.  97% on RA.  Alert & oriented.  Admission completed.  Declines head to toe skin assessment & denies any skin issues.  Patient is able to demonstrate the ability to move from a reclining position to an upright position within the recliner.  Patient is currently resting quietly in bed with call in easy reach.  Continue to monitor closely.  Elaina Everett RN

## 2025-02-15 NOTE — DISCHARGE INSTR - DIET

## 2025-02-15 NOTE — ED NOTES
Received patient on bed on semi-fowlers position, with significant other at the bedside. alert and oriented.

## 2025-02-15 NOTE — PLAN OF CARE
Problem: Chronic Conditions and Co-morbidities  Goal: Patient's chronic conditions and co-morbidity symptoms are monitored and maintained or improved  2/15/2025 1117 by Gabi Garcia RN  Outcome: Progressing  2/15/2025 0034 by Elaina Everett RN  Outcome: Progressing     Problem: Discharge Planning  Goal: Discharge to home or other facility with appropriate resources  2/15/2025 1117 by Gabi Garcia RN  Outcome: Progressing  2/15/2025 0034 by Elaina Everett RN  Outcome: Progressing     Problem: Safety - Adult  Goal: Free from fall injury  2/15/2025 1117 by Gabi Garcia RN  Outcome: Progressing  2/15/2025 0034 by Elaina Everett RN  Outcome: Progressing     Problem: Pain  Goal: Verbalizes/displays adequate comfort level or baseline comfort level  Outcome: Progressing

## 2025-02-15 NOTE — CARE COORDINATION
Case Management Assessment  Initial Evaluation    Date/Time of Evaluation: 2/15/2025 8:22 AM  Assessment Completed by: Brittney Dick    If patient is discharged prior to next notation, then this note serves as note for discharge by case management.    Patient Name: Trevor Montoya                   YOB: 1950  Diagnosis: TIA (transient ischemic attack) [G45.9]  Transient ischemic attack (TIA) [G45.9]  Cerebrovascular accident (CVA), unspecified mechanism (HCC) [I63.9]                   Date / Time: 2/14/2025  5:56 PM    Patient Admission Status: Inpatient   Readmission Risk (Low < 19, Mod (19-27), High > 27): Readmission Risk Score: 9.7    Current PCP: No primary care provider on file.  PCP verified by CM? Yes (Rajinder Franco)    Chart Reviewed: Yes      History Provided by: Patient  Patient Orientation: Alert and Oriented    Patient Cognition: Alert    Hospitalization in the last 30 days (Readmission):  No    If yes, Readmission Assessment in CM Navigator will be completed.    Advance Directives:      Code Status: Full Code   Patient's Primary Decision Maker is: Patient Declined (Legal Next of Kin Remains as Decision Maker)      Discharge Planning:    Patient lives with: Alone Type of Home: House  Primary Care Giver:    Patient Support Systems include: Family Members   Current Financial resources: Medicare  Current community resources: None  Current services prior to admission: None            Current DME:              Type of Home Care services:  None    ADLS  Prior functional level: Independent in ADLs/IADLs  Current functional level: Independent in ADLs/IADLs    PT AM-PAC:   /24  OT AM-PAC:   /24    Family can provide assistance at DC: No  Would you like Case Management to discuss the discharge plan with any other family members/significant others, and if so, who? No  Plans to Return to Present Housing: Yes  Other Identified Issues/Barriers to RETURNING to current housing: none  Potential

## 2025-02-15 NOTE — PROGRESS NOTES
4 Eyes Skin Assessment     NAME:  Trevor Montoya  YOB: 1950  MEDICAL RECORD NUMBER:  [id number]    The patient is being assessed for  Transfer to New Unit    I agree that at least one RN has performed a thorough Head to Toe Skin Assessment on the patient. ALL assessment sites listed below have been assessed.      Areas assessed by both nurses:    Head, Face, Ears, Shoulders, Back, Chest, Arms, Elbows, Hands, Sacrum. Buttock, Coccyx, Ischium, Legs. Feet and Heels, and Under Medical Devices         Does the Patient have a Wound? No noted wound(s)       Alessio Prevention initiated by RN: No  Wound Care Orders initiated by RN: No    Pressure Injury (Stage 3,4, Unstageable, DTI, NWPT, and Complex wounds) if present, place Wound referral order by RN under : No    New Ostomies, if present place, Ostomy referral order under : No     Nurse 1 eSignature: Electronically signed by Claribel Mckay RN on 2/15/25 at 12:13 PM EST    **SHARE this note so that the co-signing nurse can place an eSignature**    Nurse 2 eSignature: {Esignature:[id number]}

## 2025-02-15 NOTE — PROGRESS NOTES
PCU EKG rhythm strip with possible ST elevation.  MORAIMA Mendoza, Clinical Admin & Xiomy Lynn RN, PCU Shift Lead viewed strip.  Stat EKG ordered & completed by RT.  Dr. Reeder, Nocturnist, informed of preliminary EKG NSR R BBB questionable change in QRS axisT wave inversion in inferior leads.  Patient asymptomatic.  No new orders received.  Continue to monitor closely.  Elaina Everett RN

## 2025-02-15 NOTE — PROGRESS NOTES
Report given to Claribel VALERA. Pt denies any needs at this time. Spoke with MD Vu, New orders placed for regular diet 4 carb.

## 2025-02-15 NOTE — PROGRESS NOTES
Pt in bed during assessment. Pt does not complain of main at this time. Pt is alert and oriented. NIHSS complete. Shift assessment complete. Vitals stable. No new concerns at this time. Call light in reach.     Bedside Mobility Assessment Tool (BMAT):     Assessment Level 1- Sit and Shake    1. From a semi-reclined position, ask patient to sit up and rotate to a seated position at the side of the bed. Can use the bedrail.    2. Ask patient to reach out and grab your hand and shake making sure patient reaches across his/her midline.   Pass- Patient is able to come to a seated position, maintain core strength. Maintains seated balance while reaching across midline. Move on to Assessment Level 2.     Assessment Level 2- Stretch and Point   1. With patient in seated position at the side of the bed, have patient place both feet on the floor (or stool) with knees no higher than hips.    2. Ask patient to stretch one leg and straighten the knee, then bend the ankle/flex and point the toes. If appropriate, repeat with the other leg.   Pass- Patient is able to demonstrate appropriate quad strength on intended weight bearing limb(s). Move onto Assessment Level 3.     Assessment Level 3- Stand   1. Ask patient to elevate off the bed or chair (seated to standing) using an assistive device (cane, bedrail).    2. Patient should be able to raise buttocks off be and hold for a count of five. May repeat once.   Pass- Patient maintains standing stability for at least 5 seconds, proceed to assessment level 4.    Assessment Level 4- Walk   1. Ask patient to march in place at bedside.    2. Then ask patient to advance step and return each foot. Some medical conditions may render a patient from stepping backwards, use your best clinical judgement.   Pass- Patient demonstrates balance while shifting weight and ability to step, takes independent steps, does not use assistive device patient is MOBILITY LEVEL 4.      Mobility Level- 4

## 2025-02-15 NOTE — PROGRESS NOTES
Inpatient Occupational Therapy Evaluation, Treatment, & Discharge Summary    Unit: PCU  Date:  2/15/2025  Patient Name:    Trevor Montoya  Admitting diagnosis:  TIA (transient ischemic attack) [G45.9]  Transient ischemic attack (TIA) [G45.9]  Cerebrovascular accident (CVA), unspecified mechanism (HCC) [I63.9]  Admit Date:  2/14/2025  Precautions/Restrictions/WB Status/ Lines/ Wounds/ Oxygen: Fall risk, Lines (IV), Telemetry, and Isolation Precautions: Droplet     Pt seen for cotreatment this date due to patient safety, patient endurance, complexity of condition, and acute illness/injury    Treatment Time:  08:10-08:47  Treatment Number:  1  Timed Code Treatment Minutes: 27 minutes  Total Treatment Minutes: 37 minutes    Patient Goals for Therapy: \"to get better\"          Discharge Recommendations: Home Independently  DME needs for discharge: Needs Met       Therapy recommendations for staff:   Supervision for ambulation with use of No AD to/from chair  to/from bathroom  within room    History of Present Illness: Per admission H&P from Dr. Reeder on 2/14  \"Patient is a 74 y.o. male who presented to [facility] with past medical history of type 2 diabetes, hypertension, hyperlipidemia presented to the ED with chief complaint of difficulty with talking     Patient reported that has been having intermittent abdominal pain reported that he has seen his doctor was diagnosed with H. pylori also was noted to have some gallstones. Patient reported today he went to his chiropractor who worked as a as night and leg reported that has been going on there for years. Patient reported that he ate a burger at 2 PM and then noticed around 4 PM when he was talking to his brother that he was slurring his speech noting slurred speech ischemia further evaluation. No prior history of blood clots or strokes.\"    Preadmission Environment:   Pt lives with                                         alone  Home environment:

## 2025-02-15 NOTE — PROGRESS NOTES
Inpatient Physical Therapy Evaluation & Treatment    Unit: PCU  Date:  2/15/2025  Patient Name:    Trevor Montoya  Admitting diagnosis:  TIA (transient ischemic attack) [G45.9]  Transient ischemic attack (TIA) [G45.9]  Cerebrovascular accident (CVA), unspecified mechanism (HCC) [I63.9]  Admit Date:  2/14/2025  Precautions/Restrictions/WB Status/ Lines/ Wounds/ Oxygen: Fall risk, Lines (IV), Telemetry, and Isolation Precautions: Droplet      Pt seen for cotreatment this date due to patient safety, patient endurance, and limited functional status information    Treatment Time:  0810-0847  Treatment Number:  1   Timed Code Treatment Minutes: 27 minutes  Total Treatment Minutes:  37  minutes    Patient Stated Goals for Therapy: None stated      Discharge Recommendations: Home independently  DME needs for discharge: Needs Met       Therapy recommendation for EMS Transport: can transport by wheelchair    Therapy recommendations for staff:   Supervision for ambulation with use of No AD to/from chair  to/from bathroom  within room    History of Present Illness:   As per the H&P by Linda Reeder DO on 2/14/25 \"74 y.o. male who presented to [facility] with past medical history of type 2 diabetes, hypertension, hyperlipidemia presented to the ED with chief complaint of difficulty with talking     Patient reported that has been having intermittent abdominal pain reported that he has seen his doctor was diagnosed with H. pylori also was noted to have some gallstones.  Patient reported today he went to his chiropractor who worked as a as night and leg reported that has been going on there for years.  Patient reported that he ate a burger at 2 PM and then noticed around 4 PM when he was talking to his brother that he was slurring his speech noting slurred speech ischemia further evaluation.  No prior history of blood clots or strokes.\"    Preadmission Environment:   Pt lives with    alone  Home environment:    one story  home  Steps to enter first floor:   1 steps to enter, no handrail  Steps to second floor/basement: N/A  Laundry:     1st floor  Bathroom:     tub/shower unit, shower chair , and grab bars around toilet  Pt sleeps in a:    Flat bed  Equipment owned:    none    Preadmission Status:  Pt able to drive: Yes  Pt fully independent with ADLs: Yes  Pt receives assistance from family for: Independent PTA  Pt independent for functional transfers and utilized No Device for mobility in home and No Device out in community  History of falls: No  Home Health Services:None    AM-PAC Mobility Score    AM-PAC Inpatient Mobility Raw Score : 20       Subjective  Patient lying reclined in bed with no family present.  Pt agreeable to this PT session.     Cognition    A&O x4   Able to follow 2 step commands    Pain   No    Activity Tolerance   During therapy session noted pt with no adverse symptoms    Pt Position BP (mmHg) HR (bpm) SpO2 (%) on RA  Comments   Supine at rest       Seated at /93 98 96    Standing       End of session         Objective  Does this pt have an acute or acute on chronic diagnosis of CHF? No    Upper Extremity ROM/Strength  Please see OT evaluation.      Lower Extremity ROM / Strength   AROM WFL: Yes  ROM limitations: None    Strength Assessment (measured on a 0-5 scale):  R LE   Quad   5   Ant Tib  4+   Hamstring 4+   Iliopsoas 4+  L LE  Quad   5   Ant Tib  4+   Hamstring 4+   Iliopsoas 4+    Lower Extremity Sensation    WFL    Coordination  WNL    Neuro  Coordination Testing  Blank box below indicates item is NOT TESTED  Finger to Nose:        [x]WNL  []Impaired    Comment:   Alternating pronation/supination:   [x]WNL  []Impaired    Comment:   Finger/Thumb opposition:  [x]WNL  []Impaired    Comment:   Heel to shin (sitting or supine):      [x]WNL  []Impaired    Comment:   Alternating Toe Tapping:       [x]WNL  []Impaired    Comment:     Vision Testing  Blank box below indicates item is NOT TESTED  Visual

## 2025-02-15 NOTE — H&P
Hospital Medicine History & Physical      PCP: Behrendt, Robert E, MD    Date of Admission: 2/14/2025    Date of Service: Pt seen/examined on 2/14/2025    Pt seen/examined face to face on and admitted as inpatient with expected LOS to be two days but can change depending on diagnostic work up and treatment response.     Chief Complaint:    Chief Complaint   Patient presents with    Aphasia     From home via EMS. Pt was having aphasia starting at 330pm. No symptoms since EMS arrival to home.             ASSESSMENT AND PLAN:    Active Hospital Problems    Diagnosis Date Noted    Transient ischemic attack (TIA) [G45.9] 02/14/2025     TIA:  CT head CTA negative  Echo, MRI ordered  NIHSS    Abdominal discomfort post meal  Reported patient might be having symptomatic cholelithiasis  Talked about further workup but the patient elected to have it worked up outpatient as he thinks it might be H. pylori as well.    Hyperlipidemia: Controlled on home Statin. Outpatient PCP follow up post-discharge  Essential Hypertension: Reviewed patient's medications and plan is to continue home medication       .Due to the above diagnosis makes the patient higher risk for morbidity and mortality requiring testing and treatment      Discussion with the primary ER physician in regards to symptoms, history, physical exam, diagnosis and treatment, collaborative decision was to admit the patient.    Diet: NPO except meds ordered    DVT Prophylaxis: Pending MRI    Dispo:   Expected LOS of two days      History Of Present Illness:      74 y.o. male who presented to [facility] with past medical history of type 2 diabetes, hypertension, hyperlipidemia presented to the ED with chief complaint of difficulty with talking    Patient reported that has been having intermittent abdominal pain reported that he has seen his doctor was diagnosed with H. pylori also was noted to have some gallstones.  Patient reported today he went to his  chiropractor who worked as a as night and leg reported that has been going on there for years.  Patient reported that he ate a burger at 2 PM and then noticed around 4 PM when he was talking to his brother that he was slurring his speech noting slurred speech ischemia further evaluation.  No prior history of blood clots or strokes.  Past Medical History:          Diagnosis Date    CKD (chronic kidney disease)     Diabetes mellitus (HCC)     Hyperlipidemia     Hypertension        Past Surgical History:          Procedure Laterality Date    ANKLE SURGERY      orif    COLONOSCOPY  2011    COLONOSCOPY  09/14/2016    divert,polyps    LEG SURGERY      orif       Medications Prior to Admission:      Prior to Admission medications    Medication Sig Start Date End Date Taking? Authorizing Provider   glipiZIDE (GLUCOTROL) 10 MG tablet Take 1 tablet by mouth 2 times daily (before meals)   Yes Alan Bragg MD   Famotidine-Ca Carb-Mag Hydrox (PEPCID COMPLETE) -165 MG CHEW Take 1 tablet by mouth daily   Yes Alan Bragg MD   albuterol sulfate HFA (VENTOLIN HFA) 108 (90 Base) MCG/ACT inhaler Inhale 2 puffs into the lungs 4 times daily as needed for Wheezing 11/21/22  Yes Louie March MD   atorvastatin (LIPITOR) 20 MG tablet Take 1 tablet by mouth daily   Yes Alan Bragg MD   metformin (FORTAMET) 1000 MG (OSM) ER tablet Take 1 tablet by mouth 2 times daily 3/22/11  Yes Alan Bragg MD   lisinopril (PRINIVIL;ZESTRIL) 20 MG tablet Take 0.5 tablets by mouth daily   Yes Alan Bragg MD   pioglitazone (ACTOS) 45 MG tablet Take 1 tablet by mouth daily   Yes Alan Bragg MD   aspirin 81 MG EC tablet Take 1 tablet by mouth daily   Yes Alan Bragg MD   therapeutic multivitamin-minerals (THERAGRAN-M) tablet Take 1 tablet by mouth daily   Yes Alan Bragg MD   vitamin E 400 UNIT capsule Take 1 capsule by mouth daily   Yes Alan Bragg MD  accessory muscle usage, Normal respiratory effort. Clear to auscultation, bilaterally without wheezing  Cardiovascular:  Regular rate and rhythm, capillary refill 2 seconds  Abdomen: Soft, non-tender, non-distended with normal bowel sounds.  Musculoskeletal:  No clubbing, cyanosis. trace edema LE bilaterally.   Skin: turgor normal.  No new rashes or lesions.  Neurologic: Alert and oriented x4, no new focal sensory/motor deficits.  NIHSS 0    Labs:     Recent Labs     02/14/25  1837   WBC 7.7   HGB 13.3*   HCT 40.4*        Recent Labs     02/14/25  1837      K 3.7      CO2 26   BUN 15   CREATININE 1.1   CALCIUM 9.3     Recent Labs     02/14/25  1837   AST 17   ALT 12   BILITOT 0.3   ALKPHOS 59     No results for input(s): \"INR\" in the last 72 hours.  No results for input(s): \"CKTOTAL\", \"TROPONINI\" in the last 72 hours.    Urinalysis:      Lab Results   Component Value Date/Time    NITRU Negative 01/17/2025 01:46 PM    RBCUA 6 01/17/2025 01:46 PM    BLOODU Negative 01/17/2025 01:46 PM    GLUCOSEU 50 01/17/2025 01:46 PM       Radiology:     CXR: I have reviewed the CXR with the following interpretation:   No acute process  EKG:  I have reviewed the EKG with the following interpretation:   Normal sinus rhythm    .  CT HEAD WO CONTRAST   Final Result   No CT evidence of an acute infarct.      No large vessel occlusion in the head or neck.         CTA HEAD NECK W CONTRAST   Final Result   No CT evidence of an acute infarct.      No large vessel occlusion in the head or neck.         MRI brain without contrast    (Results Pending)                Linda Reeder DO

## 2025-02-15 NOTE — DISCHARGE SUMMARY
Name:  Trevor Montoya  Room:  0207/0207-01  MRN:    [id number]    IM Discharge Summary    Discharging Physician:  Carlos kessler MD    Admit: 2/14/2025    Discharge:      PCP      No primary care provider on file.    Diagnoses and hospital course  this Admission      Speech difficulty - likely related to anxiety   Reports he is going through lots of stress regarding his GI issues  CT head neg , CTA head/neck with no LVO  MRI brain pending  Symptoms resolved quickly with no intervention   No focal findings today   Resume home ASA     IBS- f/w Dr. Olmedo . Suppose to be on xifaxan and unfortunately could not afford it  and ongoing stress noted    HTN - can resume home ACEI     DM 2 with neuropathy - resume home meds    Hyperlipidemia - on lipitor 20 mg nightly     WANG- on CPAP    HPI   74 y.o. male who presented to [facility] with past medical history of type 2 diabetes, hypertension, hyperlipidemia presented to the ED with chief complaint of difficulty with talking     Patient reported that has been having intermittent abdominal pain reported that he has seen his doctor was diagnosed with H. pylori also was noted to have some gallstones.  Patient reported today he went to his chiropractor who worked as a as night and leg reported that has been going on there for years.  Patient reported that he ate a burger at 2 PM and then noticed around 4 PM when he was talking to his brother that he was slurring his speech noting slurred speech ischemia further evaluation.  No prior history of blood clots or strokes.    Physical Exam at Discharge:          General: elderly male up in bed  Awake, alert and oriented. Appears to be not in any distress  Mucous Membranes:  Pink , anicteric  Neck: No JVD, no carotid bruit, no thyromegaly  Chest:  Clear to auscultation bilaterally, no added sounds  Cardiovascular:  RRR S1S2 heard, no murmurs or gallops  Abdomen:  Soft, undistended, non tender, no organomegaly, BS present  Extremities:  tablet  Commonly known as: LIPITOR     glipiZIDE 10 MG tablet  Commonly known as: GLUCOTROL     lisinopril 20 MG tablet  Commonly known as: PRINIVIL;ZESTRIL     metFORMIN (OSM) 1000 MG extended release tablet  Commonly known as: FORTAMET     Pepcid Complete -165 MG Chew  Generic drug: Famotidine-Ca Carb-Mag Hydrox     pioglitazone 45 MG tablet  Commonly known as: ACTOS     therapeutic multivitamin-minerals tablet     vitamin E 400 UNIT capsule                Discharge Condition/Location: stable/home     Follow Up:    PCP in 1 weeks      Time Spent on discharge is more than 28 minutes in the examination, evaluation, counseling and review of medications and discharge plan.      Carlos Vu MD, 2/15/2025 12:13 PM

## 2025-02-16 LAB
EST. AVERAGE GLUCOSE BLD GHB EST-MCNC: 162.8 MG/DL
HBA1C MFR BLD: 7.3 %

## 2025-04-18 LAB
ALBUMIN: 4.3 G/DL (ref 3.5–5)
ANION GAP SERPL CALCULATED.3IONS-SCNC: 12 MMOL/L (ref 5–13)
BILIRUBIN, URINE: NEGATIVE
BLOOD, URINE: NEGATIVE
BUN / CREAT RATIO: 13
BUN BLDV-MCNC: 17 MG/DL (ref 7–25)
CALCIUM SERPL-MCNC: 9.8 MG/DL (ref 8.5–10.5)
CHLORIDE BLD-SCNC: 104 MMOL/L (ref 98–110)
CLARITY, UA: CLEAR
CO2: 23 MMOL/L (ref 22–29)
COLOR, UA: YELLOW
CREAT SERPL-MCNC: 1.33 MG/DL (ref 0.5–1.3)
EGFR (CKD-EPI): 55 SEE NOTE
EPITHELIAL CELLS, UA: <1 /HPF (ref 0–5)
GLUCOSE BLD-MCNC: 193 MG/DL (ref 71–99)
GLUCOSE URINE: 70 MG/DL
HYALINE CASTS: 5 /LPF (ref 0–2)
KETONES, URINE: ABNORMAL MG/DL
LEUKOCYTE ESTERASE, URINE: NEGATIVE
LEUKOCYTES, UA: 1 /HPF (ref 0–5)
MUCUS: PRESENT /HPF
NITRITE, URINE: NEGATIVE
PH, URINE: 7 (ref 5–8)
PHOSPHORUS: 3.2 MG/DL (ref 2.1–4.3)
POTASSIUM SERPL-SCNC: 3.7 MMOL/L (ref 3.5–5.1)
PROTEIN UA: 30 MG/DL
RBC UA: <1 /HPF (ref 0–3)
SODIUM BLD-SCNC: 139 MMOL/L (ref 135–146)
SPECIFIC GRAVITY UA: 1.02 (ref 1–1.03)
UROBILINOGEN, URINE: <2 MG/DL

## 2025-05-01 PROBLEM — I45.10 RBBB: Status: ACTIVE | Noted: 2025-01-01

## 2025-05-01 PROBLEM — I47.20 VENTRICULAR TACHYCARDIA (HCC): Status: ACTIVE | Noted: 2025-01-01

## 2025-05-01 PROBLEM — I63.9 ACUTE THROMBOEMBOLIC CEREBROVASCULAR ACCIDENT (CVA) (HCC): Status: ACTIVE | Noted: 2025-01-01

## 2025-05-01 PROBLEM — R79.89 TROPONIN LEVEL ELEVATED: Status: ACTIVE | Noted: 2025-01-01

## 2025-05-01 NOTE — ED NOTES
Code Stroke - Dr. Alcantara @ 1048  Called  @ 1048  Called Ct @ 1040  Called Lab @ 1047    called @ 1058

## 2025-05-01 NOTE — ED NOTES
Pt transported to [facility] at this time via transport. Pt paperwork sent with security.

## 2025-05-01 NOTE — PROGRESS NOTES
25 1407   Encounter Summary   Encounter Overview/Reason Crisis   Service Provided For Family  (BrotherStefan.)   Begin Time 1305   End Time  1407   Total Time Calculated 62 min   Crisis   Type Code Blue   Spiritual/Emotional needs   Type Spiritual Support     Visit with brother at Code/TOD.  Mentioned two  homes: [facility]  home in [place] or possibly [facility] in [place] as possible family  home they could possibly use for services.    Thank you for consulting Spiritual Health    If you would like a 's presence for emotional, spiritual, grief or comfort care,   please dial \"[phone]\" and ask for the  on-call to be paged.    For help with Advanced Care Planning, Power of  for Healthcare or Living Will forms, you may also call us directly:    [phone]) Zaki  [phone]) Mario  [phone]) Outpatient    [facility]  [facility]

## 2025-05-01 NOTE — ED NOTES
Called @1100  Per Gloria Alcantara MD  Re rate 146. wide complex  Cardiology Antoinekulwinder called back @ [phone]

## 2025-05-01 NOTE — CONSULTS
ventricular systolic function with a visually estimated EF of 55 - 60%. Left ventricle size is normal. Normal wall thickness. Normal wall motion. Grade I diastolic dysfunction with normal LAP. No thrombus present.    Right Ventricle: Right ventricle size is normal. Normal systolic function.    Aortic Valve: Trileaflet valve. Mild sclerosis of the aortic valve, noncoronary cusp. No regurgitation. No stenosis.    Mitral Valve: Valve structure is normal. Trace regurgitation. No stenosis noted.    Tricuspid Valve: Valve structure is normal. Trace regurgitation. Unable to assess RVSP due to inadequate or insignificant tricuspid regurgitation. Est RA pressure is 3 mmHg.    Pulmonic Valve: The pulmonic valve visualization is suboptimal but appears to be functioning normally. Trace regurgitation.    Interatrial Septum: No interatrial shunt visualized with color Doppler. Agitated saline study was negative with and without provocation.    Aorta: Normal sized aortic root. Dilated ascending aorta. Ao ascending diameter is 3.7 cm.    Image quality is adequate.    Signed by: Guillermo Pickens MD on 2/15/2025  1:44 PM      No results found for this or any previous visit.    CT brain:  1. Hypoperfusion in the frontal lobe of the left MCA territory with a  total volume of hypoperfusion of 34 mL and a penumbra of 34 mL. This area is at increased risk for infarction.  2. Review of the noncontrast CT scan reveals low-attenuation within the posterior lateral aspect of the left frontal lobe and anterior aspect of the left insula without sulcal effacement. These findings are   new from MRI of the head dated 2/15/2025 and   is most consistent with a subacute ischemic infarct.     MRI brain:  Multifocal recent cerebral and cerebellar infarcts, suggesting underlying thromboembolic etiology. The largest is a small to moderate infarct involving the left frontal lobe and adjacent insular cortex. These demonstrate hyperintense FLAIR signal,    compatible with infarcts older than approximately 3.5 hours.     No gross intracranial hemorrhage.     Mild atrophy and mild-to-moderate chronic small vessel ischemic change.      IMPRESSIONS:  Ventricular tachycardia  Possible atrial flutter  Right bundle branch block  Acute thromboembolic stroke  Anemia  Elevated troponin       PLAN:   - Patient presented with strokelike symptoms.  In the interim, the EKG is concerning for wide-complex tachycardia that is likely ventricular tachycardia with capture complexes.  There is a mid RP P wave present -but given that he has wide-complex tachycardia, I am unable to ascertain if there is supraventricular tachycardias with the second P wave buried in the QRS.  It should be noted that the patient did convert spontaneously to sinus rhythm after fluids were given.  The patient was subsequently started on IV amiodarone.  - CT of the head showed hypoperfusion of the frontal lobe of the left MCA.  His MRI of the brain shows multifocal recent cerebral and cerebellar infarcts suggesting underlying thromboembolic etiology.  This could be a possibility given that the patient presented with a wide-complex tachycardia with inability to discern P waves clearly if they were buried in the QRS.  - Given possible thromboembolic etiology of the strokes, would recommend starting heparin as soon as neurology believes that the risk of thromboembolic conversion is low -depending on the results of the echocardiogram as well..  - Given his ventricular tachycardia, and now conversion back to sinus rhythm with just IV fluids, I would keep him on IV amiodarone in the ICU-as per my discussion with the ER team.  - I will obtain an echocardiogram today.  - We will also involve the general cardiology team given that his troponin is significantly elevated-which may be just in the setting of stroke.  However, given that he had this wide-complex tachycardia that is consistent with likely ventricular

## 2025-05-01 NOTE — CODE DOCUMENTATION
Dr Quinton García, and SEJAL Campbell at pt bedside with staff discussing next possible interventions. Compressions ongoing.   
Pulse lost, compressions initiated and ongoing.     Dr García at pt bedside.   
Ultrasound used and a paracardial effusion found. Femoral pulse palpable.   
03-Jan-2020 09:27

## 2025-05-01 NOTE — ED PROVIDER NOTES
exam. Do not go back and change scores. Follow directions provided for each exam technique. Scores should reflect what the patient does, not what the clinician thinks the patient can do. The clinician should record answers while administering the exam and work quickly. Except where indicated, the patient should not be coached (i.e., repeated requests to patient to make a special effort).     1a  Level of consciousness: 0=alert; keenly responsive   1b. LOC questions:  2=Performs neither task correctly   1c. LOC commands: 2=Performs neither task correctly   2.  Best Gaze: 0=normal   3. Visual: 0=No visual loss   4. Facial Palsy: 1=Minor paralysis (flattened nasolabial fold, asymmetric on smiling)   5a. Motor left arm: 0=No drift, limb holds 90 (or 45) degrees for full 10 seconds   5b.  Motor right arm: 2=Some effort against gravity, limb cannot get to or maintain (if cured) 90 (or 45) degrees, drifts down to bed, but has some effort against gravity   6a. motor left le=No drift, limb holds 90 (or 45) degrees for full 10 seconds   6b  Motor right le=Some effort against gravity, limb cannot get to or maintain (if cured) 90 (or 45) degrees, drifts down to bed, but has some effort against gravity   7. Limb Ataxia: 1=Present in one limb   8.  Sensory: 0=Normal; no sensory loss   9. Best Language:  3=Mute, global aphasia; no usable speech or auditory comprehension   10. Dysarthria: 2=Severe; patient speech is so slurred as to be unintelligible in the absence of or our of proportion to any dysphagia, or is mute/anarthric   11. Extinction and Inattention: 0=No abnormality   12. Distal motor function: 0=Normal    Total:  15       LABS  I have reviewed all labs for this visit.   Results for orders placed or performed during the hospital encounter of 25   CBC Auto Differential   Result Value Ref Range    WBC 12.7 (H) 4.0 - 11.0 K/uL    RBC 4.94 4.20 - 5.90 M/uL    Hemoglobin 13.4 (L) 13.5 - 17.5 g/dL    Hematocrit  Arterial puncture at a noncompressible site in past 7 days  [] Major surgery in past 14 days  [] Gastrointestinal or urinary tract hemorrhage in past 21 days  [] Myocardial infarction in past 3 months  [] Stroke, intracranial surgery or serious head trauma in past 3 months    CLINICAL IMPRESSION  1. Wide-complex tachycardia    2. Ventricular tachycardia (HCC)    3. Thromboembolic stroke (HCC)    4. NSTEMI (non-ST elevated myocardial infarction) (HCC)        Blood pressure 118/77, pulse (!) 146, temperature 98 °F (36.7 °C), temperature source Oral, resp. rate 18, height 1.778 m (5' 10\"), weight 93.9 kg (207 lb 0.2 oz), SpO2 98%.    DISPOSITION  Trevor Montoya -      CRITICAL CARE TIME:  Total critical care time today provided was 120 minutes. This excludes seperately billable procedures and family discussion time.     Gloria Alcantara MD    Comment: Please note this report has been produced using speech recognition software and may contain errors related to that system including errors in grammar, punctuation, and spelling, as well as words and phrases that may be inappropriate. If there are any questions or concerns please feel free to contact the dictating provider for clarification.       Gloria Alcantara MD  25 4510

## 2025-05-01 NOTE — DISCHARGE INSTR - COC
Continuity of Care Form    Patient Name: Trevor Montoya   :  1950  MRN:  [id number]    Admit date:  2025  Discharge date:  ***    Code Status Order: Prior   Advance Directives:     Admitting Physician:  No admitting provider for patient encounter.  PCP: No primary care provider on file.    Discharging Nurse: ***  Discharging Hospital Unit/Room#:   Discharging Unit Phone Number: ***    Emergency Contact:   Extended Emergency Contact Information  Primary Emergency Contact: Stefan Montoya  Home Phone: [phone]  Work Phone: [phone]  Mobile Phone: [phone]  Relation: Brother/Sister   needed? No  Secondary Emergency Contact: Jake Montoya  Mobile Phone: [phone]  Relation: Child    Past Surgical History:  Past Surgical History:   Procedure Laterality Date    ANKLE SURGERY      orif    COLONOSCOPY      COLONOSCOPY  2016    divert,polyps    LEG SURGERY      orif       Immunization History:   Immunization History   Administered Date(s) Administered    COVID-19, J&J, (age 18y+), IM, 0.5 mL 2021, 2021    COVID-19, NOVAVAX, (age 12y+), IM, 0.5mL 2022       Active Problems:  Patient Active Problem List   Diagnosis Code    TIA (transient ischemic attack) G45.9    Mixed hyperlipidemia E78.2    Benign essential HTN I10    Well controlled type 2 diabetes mellitus (HCC) E11.9    Ventricular tachycardia (HCC) I47.20    RBBB I45.10    Troponin level elevated R79.89    Acute thromboembolic cerebrovascular accident (CVA) (HCC) I63.9       Isolation/Infection:   Isolation            No Isolation          Patient Infection Status    None to display              Nurse Assessment:  Last Vital Signs: /77   Pulse (!) 146   Temp 98 °F (36.7 °C) (Oral)   Resp 18   Wt 93.9 kg (207 lb 0.2 oz) Comment: Former weight in emergency  SpO2 98%   BMI 29.70 kg/m²     Last documented pain score (0-10 scale):    Last Weight:   Wt Readings from Last 1 Encounters:

## 2025-05-02 LAB
EKG ATRIAL RATE: 96 BPM
EKG DIAGNOSIS: NORMAL
EKG P AXIS: 77 DEGREES
EKG P-R INTERVAL: 166 MS
EKG Q-T INTERVAL: 382 MS
EKG QRS DURATION: 126 MS
EKG QTC CALCULATION (BAZETT): 482 MS
EKG R AXIS: 267 DEGREES
EKG T AXIS: 21 DEGREES
EKG VENTRICULAR RATE: 96 BPM

## 2025-05-02 PROCEDURE — 93010 ELECTROCARDIOGRAM REPORT: CPT | Performed by: INTERNAL MEDICINE

## 2025-05-02 NOTE — PROGRESS NOTES
Mp  Home called looking for Next of Kin info. [facility] did not have the info for the  home. Contacts that were in his chart were passed on to  home.

## 2025-05-08 NOTE — ED PROVIDER NOTES
[facility] EMERGENCY DEPARTMENT  EMERGENCY DEPARTMENT ENCOUNTER        Pt Name: Trevor Montoya  MRN: [id number]  Birthdate 1950  Date of evaluation: 5/1/2025  Provider: SEJAL Fragoso Jr  PCP: No primary care provider on file.  Note Started: 12:08 AM EDT 5/8/25       I have seen and evaluated this patient with my supervising physician Gloria Alcantara MD.      In brief this is a 74-year-old male that presented with altered mental status.  I did not initially see this patient however, the patient went unresponsive and a CODE BLUE was called to bed to where the patient was sat.  I did assist in resuscitative measures including a cardiac ultrasound which showed a very large pericardial effusion with tamponade and hypokinetic right ventricle.  CPR was in process and we did eventually get a palpable pulse.  While the attending physician Dr. Alcantara was consulting with the patient's family the patient did lose a pulse again so resuscitative measures were started.  He did have a couple more rounds of epinephrine per ACLS protocol. Dr. Medina with interventional cardiology was consulted and did come to the bedside at this time.  He was shown the real-time echocardiogram and there was concern that the patient had a ventricular rupture especially considering he had a formal echocardiogram around 10 minutes prior to this that did not have a pericardial effusion or sign of tamponade.  Ultimately resuscitative measures became futile.  The last rhythm check showed no organized cardiac activity with significant evidence of tamponade, PEA on the monitor.  Time of death was called by the attending physician Dr. Alcantara at 1339.    For further details on the rest of the patient's preresuscitation evaluation and workup please see Dr. Alcantara's full note for details on this.                 (Please note that portions of this note were completed with a voice recognition program.  Efforts were made to edit the